# Patient Record
Sex: MALE | Race: WHITE | NOT HISPANIC OR LATINO | Employment: FULL TIME | URBAN - METROPOLITAN AREA
[De-identification: names, ages, dates, MRNs, and addresses within clinical notes are randomized per-mention and may not be internally consistent; named-entity substitution may affect disease eponyms.]

---

## 2018-01-04 NOTE — PROGRESS NOTES
SUBJECTIVE:   CC: Meño Ernst is an 47 year old male who presents for preventative health visit.     Physical   Annual:     Getting at least 3 servings of Calcium per day::  NO    Bi-annual eye exam::  Yes    Dental care twice a year::  NO    Sleep apnea or symptoms of sleep apnea::  None    Diet::  Regular (no restrictions)    Frequency of exercise::  None    Taking medications regularly::  Yes    Medication side effects::  None    Additional concerns today::  No                  Hypertension and erectile dysfunction are of concern today.  Labs are pending and we will assess further based on lab results.    Today's PHQ-2 Score:   PHQ-2 ( 1999 [employer]) 1/9/2018   Q1: Little interest or pleasure in doing things 0   Q2: Feeling down, depressed or hopeless 0   PHQ-2 Score 0   Q1: Little interest or pleasure in doing things Not at all   Q2: Feeling down, depressed or hopeless Not at all   PHQ-2 Score 0       Abuse: Current or Past(Physical, Sexual or Emotional)- No  Do you feel safe in your environment - Yes    Social History   Substance Use Topics     Smoking status: Former Smoker     Quit date: 7/1/2007     Smokeless tobacco: Never Used      Comment: infrequent use  in past     Alcohol use 4.0 oz/week      Comment: moderate     Alcohol Use 1/9/2018   If you drink alcohol, do you typically have greater than 3 drinks per day OR greater than 7 drinks per week?   No     Last PSA: No results found for: PSA    Reviewed orders with patient. Reviewed health maintenance and updated orders accordingly - Yes  Labs reviewed in EPIC  BP Readings from Last 3 Encounters:   01/09/18 (!) 162/117   01/31/14 128/66   11/03/10 118/66    Wt Readings from Last 3 Encounters:   01/09/18 208 lb 6.4 oz (94.5 kg)   01/31/14 223 lb (101.2 kg)   07/14/11 189 lb 8 oz (86 kg)                  Patient Active Problem List   Diagnosis     Impotence of organic origin     Other symptoms involving urinary system     Knee pain     Nasal congestion      Hypertension, goal below 140/90     Hyperlipidemia LDL goal <160     Other male erectile dysfunction     Past Surgical History:   Procedure Laterality Date     REMOVAL OF SPERM DUCT(S)  04/21/1995    Vasectomy     SURGICAL HISTORY OF -       cyst removed above lt eye       Social History   Substance Use Topics     Smoking status: Former Smoker     Quit date: 7/1/2007     Smokeless tobacco: Never Used      Comment: infrequent use  in past     Alcohol use 4.0 oz/week      Comment: moderate     Family History   Problem Relation Age of Onset     Hypertension Father      DIABETES Paternal Grandfather      CEREBROVASCULAR DISEASE Maternal Grandmother      CEREBROVASCULAR DISEASE Maternal Grandfather          Current Outpatient Prescriptions   Medication Sig Dispense Refill     losartan-hydrochlorothiazide (HYZAAR) 50-12.5 MG per tablet Take 1 tablet by mouth daily 90 tablet 1     Allergies   Allergen Reactions     No Known Drug Allergies            Reviewed and updated as needed this visit by clinical staffTobacco  Allergies  Meds  Med Hx  Surg Hx  Fam Hx  Soc Hx        Reviewed and updated as needed this visit by Provider        Past Medical History:   Diagnosis Date     NO ACTIVE PROBLEMS       Past Surgical History:   Procedure Laterality Date     REMOVAL OF SPERM DUCT(S)  04/21/1995    Vasectomy     SURGICAL HISTORY OF -       cyst removed above lt eye       Review of Systems  C: NEGATIVE for fever, chills, change in weight  I: NEGATIVE for worrisome rashes, moles or lesions  E: NEGATIVE for vision changes or irritation  ENT: NEGATIVE for ear, mouth and throat problems  R: NEGATIVE for significant cough or SOB  CV: NEGATIVE for chest pain, palpitations or peripheral edema  GI: NEGATIVE for nausea, abdominal pain, heartburn, or change in bowel habits   male: negative for dysuria, hematuria, decreased urinary stream, erectile dysfunction, urethral discharge  M: NEGATIVE for significant arthralgias or  "myalgia  N: NEGATIVE for weakness, dizziness or paresthesias  P: NEGATIVE for changes in mood or affect    OBJECTIVE:   BP (!) 164/119 (BP Location: Left arm, Patient Position: Chair, Cuff Size: Adult Large)  Pulse 76  Temp 98.1  F (36.7  C) (Oral)  Resp 16  Ht 5' 10.5\" (1.791 m)  Wt 208 lb 6.4 oz (94.5 kg)  BMI 29.48 kg/m2    Physical Exam  GENERAL: healthy, alert and no distress  EYES: Eyes grossly normal to inspection, PERRL and conjunctivae and sclerae normal  HENT: ear canals and TM's normal, nose and mouth without ulcers or lesions  NECK: no adenopathy, no asymmetry, masses, or scars and thyroid normal to palpation  RESP: lungs clear to auscultation - no rales, rhonchi or wheezes  CV: regular rate and rhythm, normal S1 S2, no S3 or S4, no murmur, click or rub, no peripheral edema and peripheral pulses strong  ABDOMEN: soft, nontender, no hepatosplenomegaly, no masses and bowel sounds normal   (male): normal male genitalia without lesions or urethral discharge, no hernia  MS: no gross musculoskeletal defects noted, no edema  SKIN: no suspicious lesions or rashes  NEURO: Normal strength and tone, mentation intact and speech normal  PSYCH: mentation appears normal, affect normal/bright    ASSESSMENT/PLAN:   1. Encounter for WCC (well child check) with abnormal findings  - PSA, screen    2. Hypertension, goal below 140/90  We will need begin treatment immediately and follow-up in 2-3 weeks for the blood pressure check with nursing staff for further evaluation and treatment.  We discussed the pathophysiology behind hypertension and its effect on cardiac function as well as cardiac blood flow.  - Albumin Random Urine Quantitative with Creat Ratio  - Lipid panel reflex to direct LDL Fasting  - Comprehensive metabolic panel (BMP + Alb, Alk Phos, ALT, AST, Total. Bili, TP)  - CBC with platelets and differential  - losartan-hydrochlorothiazide (HYZAAR) 50-12.5 MG per tablet; Take 1 tablet by mouth daily  " "Dispense: 90 tablet; Refill: 1    3. Hyperlipidemia LDL goal <160  Labs pending    4. Other male erectile dysfunction  Labs pending adjust medication approach based on results.  - **Testosterone Free and Total FUTURE anytime  - PSA, screen    COUNSELING:   Reviewed preventive health counseling, as reflected in patient instructions       Regular exercise       Healthy diet/nutrition       Vision screening       Hearing screening           reports that he quit smoking about 10 years ago. He has never used smokeless tobacco.      Estimated body mass index is 29.48 kg/(m^2) as calculated from the following:    Height as of this encounter: 5' 10.5\" (1.791 m).    Weight as of this encounter: 208 lb 6.4 oz (94.5 kg).   Weight management plan: Discussed healthy diet and exercise guidelines and patient will follow up in 6 months in clinic to re-evaluate.    Counseling Resources:  ATP IV Guidelines  Pooled Cohorts Equation Calculator  FRAX Risk Assessment  ICSI Preventive Guidelines  Dietary Guidelines for Americans, 2010  USDA's MyPlate  ASA Prophylaxis  Lung CA Screening    Delroy Lieberman PA-C  [facility]  Answers for HPI/ROS submitted by the patient on 1/9/2018   PHQ-2 Score: 0  If you checked off any problems, how difficult have these problems made it for you to do your work, take care of things at home, or get along with other people?: Not difficult at all  PHQ9 TOTAL SCORE: 0  JEFF 7 TOTAL SCORE: 0    "

## 2018-01-09 ENCOUNTER — TELEPHONE (OUTPATIENT)
Dept: FAMILY MEDICINE | Facility: OTHER | Age: 48
End: 2018-01-09

## 2018-01-09 ENCOUNTER — OFFICE VISIT (OUTPATIENT)
Dept: FAMILY MEDICINE | Facility: OTHER | Age: 48
End: 2018-01-09
Payer: COMMERCIAL

## 2018-01-09 VITALS
HEIGHT: 71 IN | DIASTOLIC BLOOD PRESSURE: 117 MMHG | TEMPERATURE: 98.1 F | WEIGHT: 208.4 LBS | HEART RATE: 70 BPM | SYSTOLIC BLOOD PRESSURE: 162 MMHG | RESPIRATION RATE: 16 BRPM | BODY MASS INDEX: 29.18 KG/M2

## 2018-01-09 DIAGNOSIS — E78.5 HYPERLIPIDEMIA LDL GOAL <160: ICD-10-CM

## 2018-01-09 DIAGNOSIS — I10 HYPERTENSION, GOAL BELOW 140/90: ICD-10-CM

## 2018-01-09 DIAGNOSIS — Z00.121 ENCOUNTER FOR WCC (WELL CHILD CHECK) WITH ABNORMAL FINDINGS: Primary | ICD-10-CM

## 2018-01-09 DIAGNOSIS — N52.8 OTHER MALE ERECTILE DYSFUNCTION: ICD-10-CM

## 2018-01-09 LAB
ALBUMIN SERPL-MCNC: 4.4 G/DL (ref 3.4–5)
ALP SERPL-CCNC: 70 U/L (ref 40–150)
ALT SERPL W P-5'-P-CCNC: 39 U/L (ref 0–70)
ANION GAP SERPL CALCULATED.3IONS-SCNC: 6 MMOL/L (ref 3–14)
AST SERPL W P-5'-P-CCNC: 14 U/L (ref 0–45)
BASOPHILS # BLD AUTO: 0.1 10E9/L (ref 0–0.2)
BASOPHILS NFR BLD AUTO: 0.7 %
BILIRUB SERPL-MCNC: 0.8 MG/DL (ref 0.2–1.3)
BUN SERPL-MCNC: 11 MG/DL (ref 7–30)
CALCIUM SERPL-MCNC: 9.3 MG/DL (ref 8.5–10.1)
CHLORIDE SERPL-SCNC: 105 MMOL/L (ref 94–109)
CHOLEST SERPL-MCNC: 266 MG/DL
CO2 SERPL-SCNC: 26 MMOL/L (ref 20–32)
CREAT SERPL-MCNC: 0.9 MG/DL (ref 0.66–1.25)
CREAT UR-MCNC: 185 MG/DL
DIFFERENTIAL METHOD BLD: NORMAL
EOSINOPHIL # BLD AUTO: 0.2 10E9/L (ref 0–0.7)
EOSINOPHIL NFR BLD AUTO: 2.4 %
ERYTHROCYTE [DISTWIDTH] IN BLOOD BY AUTOMATED COUNT: 13.1 % (ref 10–15)
GFR SERPL CREATININE-BSD FRML MDRD: >90 ML/MIN/1.7M2
GLUCOSE SERPL-MCNC: 94 MG/DL (ref 70–99)
HCT VFR BLD AUTO: 48.2 % (ref 40–53)
HDLC SERPL-MCNC: 48 MG/DL
HGB BLD-MCNC: 16.8 G/DL (ref 13.3–17.7)
LDLC SERPL CALC-MCNC: 171 MG/DL
LYMPHOCYTES # BLD AUTO: 1.8 10E9/L (ref 0.8–5.3)
LYMPHOCYTES NFR BLD AUTO: 25.8 %
MCH RBC QN AUTO: 30.9 PG (ref 26.5–33)
MCHC RBC AUTO-ENTMCNC: 34.9 G/DL (ref 31.5–36.5)
MCV RBC AUTO: 89 FL (ref 78–100)
MICROALBUMIN UR-MCNC: 100 MG/L
MICROALBUMIN/CREAT UR: 53.95 MG/G CR (ref 0–17)
MONOCYTES # BLD AUTO: 0.4 10E9/L (ref 0–1.3)
MONOCYTES NFR BLD AUTO: 5.9 %
NEUTROPHILS # BLD AUTO: 4.6 10E9/L (ref 1.6–8.3)
NEUTROPHILS NFR BLD AUTO: 65.2 %
NONHDLC SERPL-MCNC: 218 MG/DL
PLATELET # BLD AUTO: 224 10E9/L (ref 150–450)
POTASSIUM SERPL-SCNC: 3.9 MMOL/L (ref 3.4–5.3)
PROT SERPL-MCNC: 8.2 G/DL (ref 6.8–8.8)
PSA SERPL-ACNC: 0.37 UG/L (ref 0–4)
RBC # BLD AUTO: 5.44 10E12/L (ref 4.4–5.9)
SODIUM SERPL-SCNC: 137 MMOL/L (ref 133–144)
TRIGL SERPL-MCNC: 233 MG/DL
WBC # BLD AUTO: 7 10E9/L (ref 4–11)

## 2018-01-09 PROCEDURE — 36415 COLL VENOUS BLD VENIPUNCTURE: CPT | Performed by: PHYSICIAN ASSISTANT

## 2018-01-09 PROCEDURE — 80053 COMPREHEN METABOLIC PANEL: CPT | Performed by: PHYSICIAN ASSISTANT

## 2018-01-09 PROCEDURE — 84270 ASSAY OF SEX HORMONE GLOBUL: CPT | Performed by: PHYSICIAN ASSISTANT

## 2018-01-09 PROCEDURE — 82043 UR ALBUMIN QUANTITATIVE: CPT | Performed by: PHYSICIAN ASSISTANT

## 2018-01-09 PROCEDURE — 85025 COMPLETE CBC W/AUTO DIFF WBC: CPT | Performed by: PHYSICIAN ASSISTANT

## 2018-01-09 PROCEDURE — 80061 LIPID PANEL: CPT | Performed by: PHYSICIAN ASSISTANT

## 2018-01-09 PROCEDURE — 99396 PREV VISIT EST AGE 40-64: CPT | Performed by: PHYSICIAN ASSISTANT

## 2018-01-09 PROCEDURE — 84403 ASSAY OF TOTAL TESTOSTERONE: CPT | Performed by: PHYSICIAN ASSISTANT

## 2018-01-09 PROCEDURE — G0103 PSA SCREENING: HCPCS | Performed by: PHYSICIAN ASSISTANT

## 2018-01-09 RX ORDER — LOSARTAN POTASSIUM AND HYDROCHLOROTHIAZIDE 12.5; 5 MG/1; MG/1
1 TABLET ORAL DAILY
Qty: 90 TABLET | Refills: 1 | Status: SHIPPED | OUTPATIENT
Start: 2018-01-09 | End: 2018-06-26

## 2018-01-09 ASSESSMENT — ANXIETY QUESTIONNAIRES
7. FEELING AFRAID AS IF SOMETHING AWFUL MIGHT HAPPEN: NOT AT ALL
GAD7 TOTAL SCORE: 0
5. BEING SO RESTLESS THAT IT IS HARD TO SIT STILL: NOT AT ALL
2. NOT BEING ABLE TO STOP OR CONTROL WORRYING: NOT AT ALL
6. BECOMING EASILY ANNOYED OR IRRITABLE: NOT AT ALL
GAD7 TOTAL SCORE: 0
1. FEELING NERVOUS, ANXIOUS, OR ON EDGE: NOT AT ALL
GAD7 TOTAL SCORE: 0
3. WORRYING TOO MUCH ABOUT DIFFERENT THINGS: NOT AT ALL
4. TROUBLE RELAXING: NOT AT ALL
7. FEELING AFRAID AS IF SOMETHING AWFUL MIGHT HAPPEN: NOT AT ALL

## 2018-01-09 ASSESSMENT — PATIENT HEALTH QUESTIONNAIRE - PHQ9
SUM OF ALL RESPONSES TO PHQ QUESTIONS 1-9: 0
10. IF YOU CHECKED OFF ANY PROBLEMS, HOW DIFFICULT HAVE THESE PROBLEMS MADE IT FOR YOU TO DO YOUR WORK, TAKE CARE OF THINGS AT HOME, OR GET ALONG WITH OTHER PEOPLE: NOT DIFFICULT AT ALL
SUM OF ALL RESPONSES TO PHQ QUESTIONS 1-9: 0

## 2018-01-09 ASSESSMENT — PAIN SCALES - GENERAL: PAINLEVEL: NO PAIN (0)

## 2018-01-09 NOTE — TELEPHONE ENCOUNTER
Called patient to see if he would like to use the blood pressure that was taken in clinic today.  Patient will need to sign form before it can be sent in.   Patient also needs to make appointment to follow up with provider in 2 weeks. Blood pressure from that visit can be used on form if patient would like.   Maddie Tucker CMA ([facility])

## 2018-01-09 NOTE — NURSING NOTE
"Chief Complaint   Patient presents with     Physical     Panel Management     mychart, flu shot, lipids, microalbumin, dap, phq, nacho, bmp       Initial BP (!) 164/119 (BP Location: Left arm, Patient Position: Chair, Cuff Size: Adult Large)  Pulse 76  Temp 98.1  F (36.7  C) (Oral)  Resp 16  Ht 5' 10.5\" (1.791 m)  Wt 208 lb 6.4 oz (94.5 kg)  BMI 29.48 kg/m2 Estimated body mass index is 29.48 kg/(m^2) as calculated from the following:    Height as of this encounter: 5' 10.5\" (1.791 m).    Weight as of this encounter: 208 lb 6.4 oz (94.5 kg).  Medication Reconciliation: complete  "

## 2018-01-09 NOTE — MR AVS SNAPSHOT
After Visit Summary   1/9/2018    Meño Ernst    MRN: [id number]           Patient Information     Date Of Birth          1970        Visit Information        Provider Department      1/9/2018 7:40 AM Delroy Armas PA-C [facility]        Today's Diagnoses     Encounter for WCC (well child check) with abnormal findings    -  1    Hypertension, goal below 140/90        Hyperlipidemia LDL goal <160        Other male erectile dysfunction          Care Instructions      Preventive Health Recommendations  Male Ages 40 to 49    Yearly exam:             See your health care provider every year in order to  o   Review health changes.   o   Discuss preventive care.    o   Review your medicines if your doctor has prescribed any.    You should be tested each year for STDs (sexually transmitted diseases) if you re at risk.     Have a cholesterol test every 5 years.     Have a colonoscopy (test for colon cancer) if someone in your family has had colon cancer or polyps before age 50.     After age 45, have a diabetes test (fasting glucose). If you are at risk for diabetes, you should have this test every 3 years.      Talk with your health care provider about whether or not a prostate cancer screening test (PSA) is right for you.    Shots: Get a flu shot each year. Get a tetanus shot every 10 years.     Nutrition:    Eat at least 5 servings of fruits and vegetables daily.     Eat whole-grain bread, whole-wheat pasta and brown rice instead of white grains and rice.     Talk to your provider about Calcium and Vitamin D.     Lifestyle    Exercise for at least 150 minutes a week (30 minutes a day, 5 days a week). This will help you control your weight and prevent disease.     Limit alcohol to one drink per day.     No smoking.     Wear sunscreen to prevent skin cancer.     See your dentist every six months for an exam and cleaning.              Follow-ups after your visit        Who to contact  "    If you have questions or need follow up information about today's clinic visit or your schedule please contact [facility] directly at [phone].  Normal or non-critical lab and imaging results will be communicated to you by MyChart, letter or phone within 4 business days after the clinic has received the results. If you do not hear from us within 7 days, please contact the clinic through [employer] or phone. If you have a critical or abnormal lab result, we will notify you by phone as soon as possible.  Submit refill requests through [employer] or call your pharmacy and they will forward the refill request to us. Please allow 3 business days for your refill to be completed.          Additional Information About Your Visit        nPicker Information     nPicker lets you send messages to your doctor, view your test results, renew your prescriptions, schedule appointments and more. To sign up, go to [email or web address] . Click on \"Log in\" on the left side of the screen, which will take you to the Welcome page. Then click on \"Sign up Now\" on the right side of the page.     You will be asked to enter the access code listed below, as well as some personal information. Please follow the directions to create your username and password.     Your access code is: [email or web address]  Expires: 2018  8:02 AM     Your access code will  in 90 days. If you need help or a new code, please call your [facility] clinic or [phone].        Care EveryWhere ID     This is your Care EveryWhere ID. This could be used by other organizations to access your [facility] medical records  [id number]        Your Vitals Were     Pulse Temperature Respirations Height BMI (Body Mass Index)       76 98.1  F (36.7  C) (Oral) 16 5' 10.5\" (1.791 m) 29.48 kg/m2        Blood Pressure from Last 3 Encounters:   18 (!) 164/119   14 128/66   11/03/10 118/66    Weight from Last 3 Encounters:   18 208 lb 6.4 oz (94.5 " kg)   01/31/14 223 lb (101.2 kg)   07/14/11 189 lb 8 oz (86 kg)              We Performed the Following     **Testosterone Free and Total FUTURE anytime     Albumin Random Urine Quantitative with Creat Ratio     CBC with platelets and differential     Comprehensive metabolic panel (BMP + Alb, Alk Phos, ALT, AST, Total. Bili, TP)     DEPRESSION ACTION PLAN (DAP)     Lipid panel reflex to direct LDL Fasting     PSA, screen          Today's Medication Changes          These changes are accurate as of: 1/9/18  8:02 AM.  If you have any questions, ask your nurse or doctor.               Start taking these medicines.        Dose/Directions    losartan-hydrochlorothiazide 50-12.5 MG per tablet   Commonly known as:  HYZAAR   Used for:  Hypertension, goal below 140/90   Started by:  Delroy Armas PA-C        Dose:  1 tablet   Take 1 tablet by mouth daily   Quantity:  90 tablet   Refills:  1         Stop taking these medicines if you haven't already. Please contact your care team if you have questions.     lisinopril 20 MG tablet   Commonly known as:  PRINIVIL/ZESTRIL   Stopped by:  Delroy Armas PA-C                Where to get your medicines      These medications were sent to [id number] - [place], [place] - [address] [address]  [address] [address] [address]     Phone:  [phone]     losartan-hydrochlorothiazide 50-12.5 MG per tablet                Primary Care Provider Office Phone # Fax #    Danie uGtierrez [phone]       [address] [place] [address]        Equal Access to Services     [facility] AH: Hadii aad ku hadasho Soomaali, waaxda luqadaha, qaybta kaalmada adeegyada, waxay cassie colvin. So [facility] [phone].    ATENCIÓN: Si habla español, tiene a alfaro disposición servicios gratuitos de asistencia lingüística. Llame al [phone].    We comply with applicable federal civil rights laws and [place] laws. We do not discriminate on the basis  of race, color, national origin, age, disability, sex, sexual orientation, or gender identity.            Thank you!     Thank you for choosing [facility]  for your care. Our goal is always to provide you with excellent care. Hearing back from our patients is one way we can continue to improve our services. Please take a few minutes to complete the written survey that you may receive in the mail after your visit with us. Thank you!             Your Updated Medication List - Protect others around you: Learn how to safely use, store and throw away your medicines at www.disposemymeds.org.          This list is accurate as of: 1/9/18  8:02 AM.  Always use your most recent med list.                   Brand Name Dispense Instructions for use Diagnosis    losartan-hydrochlorothiazide 50-12.5 MG per tablet    HYZAAR    90 tablet    Take 1 tablet by mouth daily    Hypertension, goal below 140/90

## 2018-01-09 NOTE — TELEPHONE ENCOUNTER
Our goal is to have forms completed with 72 hours, however some forms may require a visit or additional information.    Who is the form from?: [facility] comp  Where the form came from: Patient or family brought in     What clinic location was the form placed at?: [place]  Where the form was placed: Given to MA/RN  What number is listed as a contact on the form?: [phone]    Phone call message- patient request for a letter, form or note:    Date needed: as soon as possible  Patient needs appointment in 2 weeks with provider.  Has the patient signed a consent form for release of information? Not Applicable    Additional comments: none    Call taken on 1/9/2018 at 4:48 PM by Maddie Tucker    Type of letter, form or note: work

## 2018-01-09 NOTE — LETTER
My Depression Action Plan  Name: Meño Ernst   Date of Birth 1970  Date: 1/4/2018    My doctor: Danie Gutierrez   My clinic: [facility]  [id number] [place] [id number] [phone]          GREEN    ZONE   Good Control    What it looks like:     Things are going generally well. You have normal up s and down s. You may even feel depressed from time to time, but bad moods usually last less than a day.   What you need to do:  1. Continue to care for yourself (see self care plan)  2. Check your depression survival kit and update it as needed  3. Follow your physician s recommendations including any medication.  4. Do not stop taking medication unless you consult with your physician first.           YELLOW         ZONE Getting Worse    What it looks like:     Depression is starting to interfere with your life.     It may be hard to get out of bed; you may be starting to isolate yourself from others.    Symptoms of depression are starting to last most all day and this has happened for several days.     You may have suicidal thoughts but they are not constant.   What you need to do:     1. Call your care team, your response to treatment will improve if you keep your care team informed of your progress. Yellow periods are signs an adjustment may need to be made.     2. Continue your self-care, even if you have to fake it!    3. Talk to someone in your support network    4. Open up your depression survival kit           RED    ZONE Medical Alert - Get Help    What it looks like:     Depression is seriously interfering with your life.     You may experience these or other symptoms: You can t get out of bed most days, can t work or engage in other necessary activities, you have trouble taking care of basic hygiene, or basic responsibilities, thoughts of suicide or death that will not go away, self-injurious behavior.     What you need to do:  1. Call your care team and  request a same-day appointment. If they are not available (weekends or after hours) call your local crisis line, emergency room or 911.      Electronically signed by: Mary Thomas, January 4, 2018    Depression Self Care Plan / Survival Kit    Self-Care for Depression  Here s the deal. Your body and mind are really not as separate as most people think.  What you do and think affects how you feel and how you feel influences what you do and think. This means if you do things that people who feel good do, it will help you feel better.  Sometimes this is all it takes.  There is also a place for medication and therapy depending on how severe your depression is, so be sure to consult with your medical provider and/ or Behavioral Health Consultant if your symptoms are worsening or not improving.     In order to better manage my stress, I will:    Exercise  Get some form of exercise, every day. This will help reduce pain and release endorphins, the  feel good  chemicals in your brain. This is almost as good as taking antidepressants!  This is not the same as joining a gym and then never going! (they count on that by the way ) It can be as simple as just going for a walk or doing some gardening, anything that will get you moving.      Hygiene   Maintain good hygiene (Get out of bed in the morning, Make your bed, Brush your teeth, Take a shower, and Get dressed like you were going to work, even if you are unemployed).  If your clothes don't fit try to get ones that do.    Diet  I will strive to eat foods that are good for me, drink plenty of water, and avoid excessive sugar, caffeine, alcohol, and other mood-altering substances.  Some foods that are helpful in depression are: complex carbohydrates, B vitamins, flaxseed, fish or fish oil, fresh fruits and vegetables.    Psychotherapy  I agree to participate in Individual Therapy (if recommended).    Medication  If prescribed medications, I agree to take them.   Missing doses can result in serious side effects.  I understand that drinking alcohol, or other illicit drug use, may cause potential side effects.  I will not stop my medication abruptly without first discussing it with my provider.    Staying Connected With Others  I will stay in touch with my friends, family members, and my primary care provider/team.    Use your imagination  Be creative.  We all have a creative side; it doesn t matter if it s oil painting, sand castles, or mud pies! This will also kick up the endorphins.    Witness Beauty  (AKA stop and smell the roses) Take a look outside, even in mid-winter. Notice colors, textures. Watch the squirrels and birds.     Service to others  Be of service to others.  There is always someone else in need.  By helping others we can  get out of ourselves  and remember the really important things.  This also provides opportunities for practicing all the other parts of the program.    Humor  Laugh and be silly!  Adjust your TV habits for less news and crime-drama and more comedy.    Control your stress  Try breathing deep, massage therapy, biofeedback, and meditation. Find time to relax each day.     My support system    Clinic Contact:  Phone number:    Contact 1:  Phone number:    Contact 2:  Phone number:    Evangelical/:  Phone number:    Therapist:  Phone number:    Local crisis center:    Phone number:    Other community support:  Phone number:

## 2018-01-10 ASSESSMENT — PATIENT HEALTH QUESTIONNAIRE - PHQ9: SUM OF ALL RESPONSES TO PHQ QUESTIONS 1-9: 0

## 2018-01-10 ASSESSMENT — ANXIETY QUESTIONNAIRES: GAD7 TOTAL SCORE: 0

## 2018-01-10 NOTE — TELEPHONE ENCOUNTER
Pt is wanted to hold of on the form, because he is going to try the new medication first and then check his bp at his appointment on 1/23rd  and then the form can be filled out and sent at that time.

## 2018-01-11 ENCOUNTER — TELEPHONE (OUTPATIENT)
Dept: FAMILY MEDICINE | Facility: OTHER | Age: 48
End: 2018-01-11

## 2018-01-11 DIAGNOSIS — E78.5 HYPERLIPIDEMIA LDL GOAL <130: Primary | ICD-10-CM

## 2018-01-11 LAB
SHBG SERPL-SCNC: 12 NMOL/L (ref 11–80)
TESTOST FREE SERPL-MCNC: 8.62 NG/DL (ref 4.7–24.4)
TESTOST SERPL-MCNC: 282 NG/DL (ref 240–950)

## 2018-01-11 RX ORDER — ATORVASTATIN CALCIUM 20 MG/1
20 TABLET, FILM COATED ORAL DAILY
Qty: 90 TABLET | Refills: 1 | Status: SHIPPED | OUTPATIENT
Start: 2018-01-11 | End: 2018-07-06

## 2018-01-11 NOTE — TELEPHONE ENCOUNTER
Spoke with patient informed him of results below, he is willing to try the medication you suggested he uses [facility] in [place], thanks  Cynthia Gould RT (R)      Notes Recorded by Delroy Lieberman PA-C on 1/11/2018 at 1:28 PM  After review of labs we note your cholesterol is more elevated than it should be and you should consider taking a medication each night at bedtime to help lower this.  You also are encouraged to begin diet and exercise regimen to help with this.  Your albumin is a bit elevated as well and I would encourage you to have both the test rechecked in 3 months.  If you are willing to begin a medication to help treat this please let us know and we will send it to your pharmacy.  I would advise Lipitor at 20 mg each night and follow-up in 3 months.  Electronically signed:    Delroy Lieberman PA-C

## 2018-01-16 NOTE — PROGRESS NOTES
SUBJECTIVE:                                                    Meño Ernst is a 47 year old male who presents to clinic today for the following health issues:    History of Present Illness     Hyperlipidemia:     Low fat/chol diet rating::  Fair    Taking Statins::  YES    Side effects from hypolipidemia medication::  No muscle aches from Statin    Lipid Medications or Supplements::  None    Hypertension:     Outpatient blood pressures:  Are not being checked    Dietary sodium intake::  Not monitoring salt intake    Diet:  Regular (no restrictions)  Frequency of exercise:  1 day/week  Duration of exercise:  Less than 15 minutes  Taking medications regularly:  Yes  Medication side effects:  None    Problem list and histories reviewed & adjusted, as indicated.  Additional history: as documented    Patient Active Problem List   Diagnosis     Impotence of organic origin     Other symptoms involving urinary system     Knee pain     Nasal congestion     Hypertension, goal below 140/90     Hyperlipidemia LDL goal <130     Other male erectile dysfunction     Past Surgical History:   Procedure Laterality Date     REMOVAL OF SPERM DUCT(S)  04/21/1995    Vasectomy     SURGICAL HISTORY OF -       cyst removed above lt eye       Social History   Substance Use Topics     Smoking status: Former Smoker     Quit date: 7/1/2007     Smokeless tobacco: Never Used      Comment: infrequent use  in past     Alcohol use 4.0 oz/week      Comment: moderate     Family History   Problem Relation Age of Onset     Hypertension Father      DIABETES Paternal Grandfather      CEREBROVASCULAR DISEASE Maternal Grandmother      CEREBROVASCULAR DISEASE Maternal Grandfather          Current Outpatient Prescriptions   Medication Sig Dispense Refill     atorvastatin (LIPITOR) 20 MG tablet Take 1 tablet (20 mg) by mouth daily 90 tablet 1     losartan-hydrochlorothiazide (HYZAAR) 50-12.5 MG per tablet Take 1 tablet by mouth daily 90 tablet 1      Allergies   Allergen Reactions     No Known Drug Allergies      Recent Labs   Lab Test  01/09/18   0808  01/31/14   1015  06/28/10   1546   LDL  171*  142*  159*   HDL  48  38*  48   TRIG  233*  241*  131   ALT  39   --   27   CR  0.90  0.99  0.93   GFRESTIMATED  >90  Not Calculated  >90   GFRESTBLACK  >90  Not Calculated  >90   POTASSIUM  3.9  3.9  4.5   TSH   --   1.41   --       BP Readings from Last 3 Encounters:   01/23/18 142/78   01/09/18 (!) 162/117   01/31/14 128/66    Wt Readings from Last 3 Encounters:   01/23/18 212 lb 3.2 oz (96.3 kg)   01/09/18 208 lb 6.4 oz (94.5 kg)   01/31/14 223 lb (101.2 kg)                  Labs reviewed in EPIC          ROS:  Constitutional, HEENT, cardiovascular, pulmonary, gi and gu systems are negative, except as otherwise noted.      OBJECTIVE:   /80 (BP Location: Right arm, Patient Position: Chair, Cuff Size: Adult Regular)  Pulse 76  Temp 97.5  F (36.4  C) (Temporal)  Resp 16  Wt 212 lb 3.2 oz (96.3 kg)  BMI 30.02 kg/m2  Body mass index is 30.02 kg/(m^2).  GENERAL: healthy, alert and no distress  NECK: no adenopathy, no asymmetry, masses, or scars and thyroid normal to palpation  RESP: lungs clear to auscultation - no rales, rhonchi or wheezes  CV: regular rate and rhythm, normal S1 S2, no S3 or S4, no murmur, click or rub, no peripheral edema and peripheral pulses strong  MS: no gross musculoskeletal defects noted, no edema    Diagnostic Test Results:  No results found for this or any previous visit (from the past 24 hour(s)).    ASSESSMENT/PLAN:     1. Hypertension, goal below 140/90  Doing well, much improved.    ROV 3 months    Delroy Lieberman PA-C  [facility]  Answers for HPI/ROS submitted by the patient on 1/23/2018   PHQ-2 Score: 0

## 2018-01-23 ENCOUNTER — OFFICE VISIT (OUTPATIENT)
Dept: FAMILY MEDICINE | Facility: OTHER | Age: 48
End: 2018-01-23
Payer: COMMERCIAL

## 2018-01-23 VITALS
BODY MASS INDEX: 30.02 KG/M2 | HEART RATE: 76 BPM | SYSTOLIC BLOOD PRESSURE: 134 MMHG | RESPIRATION RATE: 16 BRPM | WEIGHT: 212.2 LBS | DIASTOLIC BLOOD PRESSURE: 80 MMHG | TEMPERATURE: 97.5 F

## 2018-01-23 DIAGNOSIS — I10 HYPERTENSION, GOAL BELOW 140/90: Primary | ICD-10-CM

## 2018-01-23 PROCEDURE — 99213 OFFICE O/P EST LOW 20 MIN: CPT | Performed by: PHYSICIAN ASSISTANT

## 2018-01-23 ASSESSMENT — PAIN SCALES - GENERAL: PAINLEVEL: NO PAIN (0)

## 2018-01-23 NOTE — MR AVS SNAPSHOT
"              After Visit Summary   2018    Meño Ernst    MRN: [id number]           Patient Information     Date Of Birth          1970        Visit Information        Provider Department      2018 7:40 AM Delroy Armas PA-C [facility]        Today's Diagnoses     Hypertension, goal below 140/90    -  1       Follow-ups after your visit        Who to contact     If you have questions or need follow up information about today's clinic visit or your schedule please contact [facility] directly at [phone].  Normal or non-critical lab and imaging results will be communicated to you by Sw[employer]thart, letter or phone within 4 business days after the clinic has received the results. If you do not hear from us within 7 days, please contact the clinic through Swyfthart or phone. If you have a critical or abnormal lab result, we will notify you by phone as soon as possible.  Submit refill requests through [employer] or call your pharmacy and they will forward the refill request to us. Please allow 3 business days for your refill to be completed.          Additional Information About Your Visit        MyChart Information     [employer] lets you send messages to your doctor, view your test results, renew your prescriptions, schedule appointments and more. To sign up, go to [email or web address] . Click on \"Log in\" on the left side of the screen, which will take you to the Welcome page. Then click on \"Sign up Now\" on the right side of the page.     You will be asked to enter the access code listed below, as well as some personal information. Please follow the directions to create your username and password.     Your access code is: [email or web address]  Expires: 2018  8:02 AM     Your access code will  in 90 days. If you need help or a new code, please call your [facility] or [phone].        Care EveryWhere ID     This is your Care EveryWhere ID. This could be used by " other organizations to access your [place] medical records  [id number]        Your Vitals Were     Pulse Temperature Respirations BMI (Body Mass Index)          76 97.5  F (36.4  C) (Temporal) 16 30.02 kg/m2         Blood Pressure from Last 3 Encounters:   01/23/18 142/78   01/09/18 (!) 162/117   01/31/14 128/66    Weight from Last 3 Encounters:   01/23/18 212 lb 3.2 oz (96.3 kg)   01/09/18 208 lb 6.4 oz (94.5 kg)   01/31/14 223 lb (101.2 kg)              Today, you had the following     No orders found for display       Primary Care Provider Office Phone # Fax #    Danie Chuckie Gutierrez [phone]       [address] [place] [address]        Equal Access to Services     [facility]: Hadii mary reyes hadasho Soomaali, waaxda luqadaha, qaybta kaalmada adeegyada, herbert guardado . So [facility] [phone].    ATENCIÓN: Si habla español, tiene a alfaro disposición servicios gratuitos de asistencia lingüística. Boby al [phone].    We comply with applicable federal civil rights laws and [place] laws. We do not discriminate on the basis of race, color, national origin, age, disability, sex, sexual orientation, or gender identity.            Thank you!     Thank you for choosing [facility]  for your care. Our goal is always to provide you with excellent care. Hearing back from our patients is one way we can continue to improve our services. Please take a few minutes to complete the written survey that you may receive in the mail after your visit with us. Thank you!             Your Updated Medication List - Protect others around you: Learn how to safely use, store and throw away your medicines at www.disposemymeds.org.          This list is accurate as of: 1/23/18  7:53 AM.  Always use your most recent med list.                   Brand Name Dispense Instructions for use Diagnosis    atorvastatin 20 MG tablet    LIPITOR    90 tablet    Take 1 tablet (20 mg)  by mouth daily    Hyperlipidemia LDL goal <130       losartan-hydrochlorothiazide 50-12.5 MG per tablet    HYZAAR    90 tablet    Take 1 tablet by mouth daily    Hypertension, goal below 140/90

## 2018-01-23 NOTE — TELEPHONE ENCOUNTER
Forms have been completed, signed, faxed/mailed, and sent to scanning.  Maddie Tucker CMA ([facility])

## 2018-01-23 NOTE — NURSING NOTE
"Chief Complaint   Patient presents with     Hypertension     Panel Management     Flu       Initial /78 (BP Location: Right arm, Patient Position: Chair, Cuff Size: Adult Large)  Pulse 76  Temp 97.5  F (36.4  C) (Temporal)  Resp 16  Wt 212 lb 3.2 oz (96.3 kg)  BMI 30.02 kg/m2 Estimated body mass index is 30.02 kg/(m^2) as calculated from the following:    Height as of 1/9/18: 5' 10.5\" (1.791 m).    Weight as of this encounter: 212 lb 3.2 oz (96.3 kg).  Medication Reconciliation: complete  "

## 2018-06-26 DIAGNOSIS — I10 HYPERTENSION, GOAL BELOW 140/90: ICD-10-CM

## 2018-06-27 RX ORDER — LOSARTAN POTASSIUM AND HYDROCHLOROTHIAZIDE 12.5; 5 MG/1; MG/1
1 TABLET ORAL DAILY
Qty: 30 TABLET | Refills: 0 | Status: SHIPPED | OUTPATIENT
Start: 2018-06-27 | End: 2018-07-06 | Stop reason: ALTCHOICE

## 2018-06-27 NOTE — TELEPHONE ENCOUNTER
Left message for patient to return call to clinic.  Please inform of message below and help schedule follow up appointments.    Brii Cespedes, [facility]  June 27, 2018

## 2018-06-27 NOTE — TELEPHONE ENCOUNTER
"Requested Prescriptions   Pending Prescriptions Disp Refills     losartan-hydrochlorothiazide (HYZAAR) 50-12.5 MG per tablet 90 tablet 1     Sig: Take 1 tablet by mouth daily    Angiotensin-II Receptors Passed    6/26/2018 11:01 AM       Passed - Blood pressure under 140/90 in past 12 months    BP Readings from Last 3 Encounters:   01/23/18 134/80   01/09/18 (!) 162/117   01/31/14 128/66          Passed - Recent (12 mo) or future (30 days) visit within the authorizing provider's specialty    Patient had office visit in the last 12 months or has a visit in the next 30 days with authorizing provider or within the authorizing provider's specialty.  See \"Patient Info\" tab in inbasket, or \"Choose Columns\" in Meds & Orders section of the refill encounter.           Passed - Patient is age 18 or older       Passed - Normal serum creatinine on file in past 12 months    Recent Labs   Lab Test  01/09/18   0808   CR  0.90          Passed - Normal serum potassium on file in past 12 months    Recent Labs   Lab Test  01/09/18   0808   POTASSIUM  3.9            losartan-hydrochlorothiazide (HYZAAR) 50-12.5 MG per tablet  Medication is being filled for 1 time refill only due to:  Patient needs to be seen because over due for 3 month HTN followup.   Please assist with scheduling.    Katie Ramos, RN, BSN         "

## 2018-06-28 NOTE — PROGRESS NOTES
SUBJECTIVE:   Meño Ernst is a 47 year old male who presents to clinic today for the following health issues:      History of Present Illness     Hyperlipidemia:     Low fat/chol diet rating::  Fair    Taking Statins::  STOPPED    Side effects from hypolipidemia medication::  Muscle aches after taking Statin    Hypertension:     Outpatient blood pressures:  Are not being checked    Dietary sodium intake::  Not monitoring salt intake    Diet:  Regular (no restrictions)  Frequency of exercise:  1 day/week  Duration of exercise:  30-45 minutes  Taking medications regularly:  Yes  Medication side effects:  Muscle aches    Problem list and histories reviewed & adjusted, as indicated.  Additional history: as documented    Patient Active Problem List   Diagnosis     Impotence of organic origin     Other symptoms involving urinary system     Knee pain     Nasal congestion     Hypertension, goal below 140/90     Hyperlipidemia LDL goal <130     Other male erectile dysfunction     Past Surgical History:   Procedure Laterality Date     REMOVAL OF SPERM DUCT(S)  04/21/1995    Vasectomy     SURGICAL HISTORY OF -       cyst removed above lt eye       Social History   Substance Use Topics     Smoking status: Former Smoker     Quit date: 7/1/2007     Smokeless tobacco: Never Used      Comment: infrequent use  in past     Alcohol use 4.0 oz/week      Comment: moderate     Family History   Problem Relation Age of Onset     Hypertension Father      Diabetes Paternal Grandfather      Cerebrovascular Disease Maternal Grandmother      Cerebrovascular Disease Maternal Grandfather          Current Outpatient Prescriptions   Medication Sig Dispense Refill     losartan-hydrochlorothiazide (HYZAAR) 100-25 MG per tablet Take 1 tablet by mouth daily 90 tablet 1     pravastatin (PRAVACHOL) 40 MG tablet Take 1 tablet (40 mg) by mouth daily 90 tablet 1     Allergies   Allergen Reactions     No Known Drug Allergies      Recent Labs   Lab  Test  01/09/18   0808  01/31/14   1015  06/28/10   1546   LDL  171*  142*  159*   HDL  48  38*  48   TRIG  233*  241*  131   ALT  39   --   27   CR  0.90  0.99  0.93   GFRESTIMATED  >90  Not Calculated  >90   GFRESTBLACK  >90  Not Calculated  >90   POTASSIUM  3.9  3.9  4.5   TSH   --   1.41   --       BP Readings from Last 3 Encounters:   07/06/18 (!) (P) 152/100   01/23/18 134/80   01/09/18 (!) 162/117    Wt Readings from Last 3 Encounters:   07/06/18 209 lb (94.8 kg)   01/23/18 212 lb 3.2 oz (96.3 kg)   01/09/18 208 lb 6.4 oz (94.5 kg)                    ROS:  Constitutional, HEENT, cardiovascular, pulmonary, gi and gu systems are negative, except as otherwise noted.    OBJECTIVE:     BP (!) (P) 152/100 (Cuff Size: Adult Large)  Pulse 72  Temp 97.1  F (36.2  C) (Temporal)  Resp 18  Wt 209 lb (94.8 kg)  BMI 29.56 kg/m2  Body mass index is 29.56 kg/(m^2).  GENERAL: healthy, alert and no distress  NECK: no adenopathy, no asymmetry, masses, or scars and trachea midline and normal to palpation  RESP: lungs clear to auscultation - no rales, rhonchi or wheezes  CV: regular rate and rhythm, normal S1 S2, no S3 or S4, no murmur, click or rub, no peripheral edema and peripheral pulses strong  MS: no gross musculoskeletal defects noted, no edema  NEURO: Normal strength and tone, mentation intact and speech normal  PSYCH: mentation appears normal, affect normal/bright    Diagnostic Test Results:  No results found for this or any previous visit (from the past 24 hour(s)).    ASSESSMENT/PLAN:     1. Hypertension, goal below 140/90  Needs increased dose and ROV 2 weeks.  - losartan-hydrochlorothiazide (HYZAAR) 100-25 MG per tablet; Take 1 tablet by mouth daily  Dispense: 90 tablet; Refill: 1  - Lipid panel reflex to direct LDL Fasting; Future  - Comprehensive metabolic panel; Future    2. Hyperlipidemia LDL goal <130  Needs to start meds   - Lipid panel reflex to direct LDL Fasting; Future  - Comprehensive metabolic panel;  Future  - pravastatin (PRAVACHOL) 40 MG tablet; Take 1 tablet (40 mg) by mouth daily  Dispense: 90 tablet; Refill: 1    ROV 2 weeks for blood pressure check  ROV 3 months for med check.    Delroy Lieberman PA-C  [facility]  Answers for HPI/ROS submitted by the patient on 7/6/2018   PHQ-2 Score: 0  If you checked off any problems, how difficult have these problems made it for you to do your work, take care of things at home, or get along with other people?: Not difficult at all  PHQ9 TOTAL SCORE: 0  JEFF 7 TOTAL SCORE: 0

## 2018-07-06 ENCOUNTER — OFFICE VISIT (OUTPATIENT)
Dept: FAMILY MEDICINE | Facility: OTHER | Age: 48
End: 2018-07-06
Payer: COMMERCIAL

## 2018-07-06 VITALS
TEMPERATURE: 97.1 F | DIASTOLIC BLOOD PRESSURE: 100 MMHG | BODY MASS INDEX: 29.56 KG/M2 | HEART RATE: 72 BPM | RESPIRATION RATE: 18 BRPM | WEIGHT: 209 LBS | SYSTOLIC BLOOD PRESSURE: 132 MMHG

## 2018-07-06 DIAGNOSIS — E78.5 HYPERLIPIDEMIA LDL GOAL <130: ICD-10-CM

## 2018-07-06 DIAGNOSIS — I10 HYPERTENSION, GOAL BELOW 140/90: ICD-10-CM

## 2018-07-06 PROCEDURE — 99213 OFFICE O/P EST LOW 20 MIN: CPT | Performed by: PHYSICIAN ASSISTANT

## 2018-07-06 RX ORDER — LOSARTAN POTASSIUM AND HYDROCHLOROTHIAZIDE 12.5; 5 MG/1; MG/1
1 TABLET ORAL DAILY
Qty: 30 TABLET | Refills: 0 | Status: CANCELLED | OUTPATIENT
Start: 2018-07-06

## 2018-07-06 RX ORDER — LOSARTAN POTASSIUM AND HYDROCHLOROTHIAZIDE 25; 100 MG/1; MG/1
1 TABLET ORAL DAILY
Qty: 90 TABLET | Refills: 1 | Status: SHIPPED | OUTPATIENT
Start: 2018-07-06 | End: 2019-02-19

## 2018-07-06 RX ORDER — ATORVASTATIN CALCIUM 20 MG/1
20 TABLET, FILM COATED ORAL DAILY
Qty: 90 TABLET | Refills: 1 | Status: SHIPPED | OUTPATIENT
Start: 2018-07-06 | End: 2018-07-06

## 2018-07-06 RX ORDER — PRAVASTATIN SODIUM 40 MG
40 TABLET ORAL DAILY
Qty: 90 TABLET | Refills: 1 | Status: SHIPPED | OUTPATIENT
Start: 2018-07-06 | End: 2019-02-19

## 2018-07-06 ASSESSMENT — ANXIETY QUESTIONNAIRES
5. BEING SO RESTLESS THAT IT IS HARD TO SIT STILL: NOT AT ALL
GAD7 TOTAL SCORE: 0
GAD7 TOTAL SCORE: 0
7. FEELING AFRAID AS IF SOMETHING AWFUL MIGHT HAPPEN: NOT AT ALL
GAD7 TOTAL SCORE: 0
2. NOT BEING ABLE TO STOP OR CONTROL WORRYING: NOT AT ALL
6. BECOMING EASILY ANNOYED OR IRRITABLE: NOT AT ALL
4. TROUBLE RELAXING: NOT AT ALL
1. FEELING NERVOUS, ANXIOUS, OR ON EDGE: NOT AT ALL
3. WORRYING TOO MUCH ABOUT DIFFERENT THINGS: NOT AT ALL
7. FEELING AFRAID AS IF SOMETHING AWFUL MIGHT HAPPEN: NOT AT ALL

## 2018-07-06 ASSESSMENT — PAIN SCALES - GENERAL: PAINLEVEL: NO PAIN (0)

## 2018-07-06 NOTE — MR AVS SNAPSHOT
After Visit Summary   7/6/2018    Meño Ernst    MRN: [id number]           Patient Information     Date Of Birth          1970        Visit Information        Provider Department      7/6/2018 7:40 AM Delroy Armas PA-C [facility]        Today's Diagnoses     Hypertension, goal below 140/90        Hyperlipidemia LDL goal <130           Follow-ups after your visit        Follow-up notes from your care team     Return in about 2 weeks (around 7/20/2018) for BP Recheck with provider.      Future tests that were ordered for you today     Open Future Orders        Priority Expected Expires Ordered    Lipid panel reflex to direct LDL Fasting Routine  8/6/2018 7/6/2018    Comprehensive metabolic panel Routine  8/6/2018 7/6/2018            Who to contact     If you have questions or need follow up information about today's clinic visit or your schedule please contact [facility] directly at [phone].  Normal or non-critical lab and imaging results will be communicated to you by MyChart, letter or phone within 4 business days after the clinic has received the results. If you do not hear from us within 7 days, please contact the clinic through MyChart or phone. If you have a critical or abnormal lab result, we will notify you by phone as soon as possible.  Submit refill requests through [employer] or call your pharmacy and they will forward the refill request to us. Please allow 3 business days for your refill to be completed.          Additional Information About Your Visit        Care EveryWhere ID     This is your Care EveryWhere ID. This could be used by other organizations to access your [place] medical records  [id number]        Your Vitals Were     Pulse Temperature Respirations BMI (Body Mass Index)          72 97.1  F (36.2  C) (Temporal) 18 29.56 kg/m2         Blood Pressure from Last 3 Encounters:   07/06/18 (!) (P) 152/100   01/23/18 134/80   01/09/18  (!) 162/117    Weight from Last 3 Encounters:   07/06/18 209 lb (94.8 kg)   01/23/18 212 lb 3.2 oz (96.3 kg)   01/09/18 208 lb 6.4 oz (94.5 kg)                 Today's Medication Changes          These changes are accurate as of 7/6/18  7:59 AM.  If you have any questions, ask your nurse or doctor.               Start taking these medicines.        Dose/Directions    losartan-hydrochlorothiazide 100-25 MG per tablet   Commonly known as:  HYZAAR   Used for:  Hypertension, goal below 140/90   Replaces:  losartan-hydrochlorothiazide 50-12.5 MG per tablet   Started by:  Delroy Armas PA-C        Dose:  1 tablet   Take 1 tablet by mouth daily   Quantity:  90 tablet   Refills:  1       pravastatin 40 MG tablet   Commonly known as:  PRAVACHOL   Used for:  Hyperlipidemia LDL goal <130   Started by:  Delroy Armas PA-C        Dose:  40 mg   Take 1 tablet (40 mg) by mouth daily   Quantity:  90 tablet   Refills:  1         Stop taking these medicines if you haven't already. Please contact your care team if you have questions.     atorvastatin 20 MG tablet   Commonly known as:  LIPITOR   Stopped by:  Delroy Armas PA-C           losartan-hydrochlorothiazide 50-12.5 MG per tablet   Commonly known as:  HYZAAR   Replaced by:  losartan-hydrochlorothiazide 100-25 MG per tablet   Stopped by:  Delroy Armas PA-C                Where to get your medicines      These medications were sent to [id number] - [place], [place] - [address] [address]  [address] [address] [address]     Phone:  [phone]     losartan-hydrochlorothiazide 100-25 MG per tablet    pravastatin 40 MG tablet                Primary Care Provider Office Phone # Fax #    Danie Gutierrez [phone]       [address] [place] [address]        Equal Access to Services     KELI NAVA AH: Tyler landa Soruslan, waaxda luqadaha, qaybta kaalmada adeegyada, herbert colvin. So [facility]  [phone].    ATENCIÓN: Si jung weeks, tiene a alfaro disposición servicios gratuitos de asistencia lingüística. Boby blum [phone].    We comply with applicable federal civil rights laws and [place] laws. We do not discriminate on the basis of race, color, national origin, age, disability, sex, sexual orientation, or gender identity.            Thank you!     Thank you for choosing [facility]  for your care. Our goal is always to provide you with excellent care. Hearing back from our patients is one way we can continue to improve our services. Please take a few minutes to complete the written survey that you may receive in the mail after your visit with us. Thank you!             Your Updated Medication List - Protect others around you: Learn how to safely use, store and throw away your medicines at www.disposemymeds.org.          This list is accurate as of 7/6/18  7:59 AM.  Always use your most recent med list.                   Brand Name Dispense Instructions for use Diagnosis    losartan-hydrochlorothiazide 100-25 MG per tablet    HYZAAR    90 tablet    Take 1 tablet by mouth daily    Hypertension, goal below 140/90       pravastatin 40 MG tablet    PRAVACHOL    90 tablet    Take 1 tablet (40 mg) by mouth daily    Hyperlipidemia LDL goal <130

## 2018-07-07 ASSESSMENT — PATIENT HEALTH QUESTIONNAIRE - PHQ9: SUM OF ALL RESPONSES TO PHQ QUESTIONS 1-9: 0

## 2018-07-07 ASSESSMENT — ANXIETY QUESTIONNAIRES: GAD7 TOTAL SCORE: 0

## 2018-07-25 ENCOUNTER — TELEPHONE (OUTPATIENT)
Dept: FAMILY MEDICINE | Facility: OTHER | Age: 48
End: 2018-07-25

## 2018-07-25 NOTE — TELEPHONE ENCOUNTER
Reason for Call:  Medication or medication refill:    Do you use a [facility]?  Name of the pharmacy and phone number for the current request:  [facility] - [phone]    Name of the medication requested: lisinopril     Other request:   Patient had a health screening at work and his blood pressure 3/4 of the times was still high. Is wondering if he should change back to lisinopril?   Currently taking losartan    Can we leave a detailed message on this number? YES    Phone number patient can be reached at: Home number on file [phone] (home)    Best Time: any    Call taken on 7/25/2018 at 8:19 AM by Marbella Romero

## 2018-07-25 NOTE — TELEPHONE ENCOUNTER
"Per OV on 7/6/18:    1. Hypertension, goal below 140/90  Needs increased dose and ROV 2 weeks.  - losartan-hydrochlorothiazide (HYZAAR) 100-25 MG per tablet; Take 1 tablet by mouth daily  Dispense: 90 tablet; Refill: 1  - Lipid panel reflex to direct LDL Fasting; Future  - Comprehensive metabolic panel; Future     2. Hyperlipidemia LDL goal <130  Needs to start meds   - Lipid panel reflex to direct LDL Fasting; Future  - Comprehensive metabolic panel; Future  - pravastatin (PRAVACHOL) 40 MG tablet; Take 1 tablet (40 mg) by mouth daily  Dispense: 90 tablet; Refill: 1     ROV 2 weeks for blood pressure check  ROV 3 months for med check.     Delroy Lieberman PA-C  [facility]    Spoke with pt and he declined scheduling any of the above visits as he \"just had this checked with someone at work and do not see the point in coming in and spending more money on something was just seen for\". Pt did not have readings on hand as they are at home and he is at work. Offered to schedule float nurse visit and stressed that this is a no cost visit for him to get at least 1 BP reading in his chart for review but declined doing this. Stated he can call tonight or tomorrow with BP readings he got from his work for provider to review. Will wait for him call back with readings.    Stephanie Muir, SATNAM ([facility])    "

## 2018-07-27 NOTE — TELEPHONE ENCOUNTER
Left message for pt to return our call    When pt calls back please get the latest bp readings from him.

## 2018-07-30 NOTE — TELEPHONE ENCOUNTER
Left message for pt to return call, when call is returned give information below and help schedule OV with PCP (Dr Gutierrez) for BP check and discuss change in medication. If Dr Gutierrez is no longer PCP please update accordingly.     Stephanie Muir CMA ([facility])

## 2018-07-30 NOTE — TELEPHONE ENCOUNTER
Patient needs OV for HTN.  Would advise Toprol XL 25mg daily.  Electronically signed:    Delroy Lieberman PA-C

## 2018-07-31 NOTE — TELEPHONE ENCOUNTER
Patient returned call and was given information below, patient doesn't want to schedule an appointment at this time.    Thank you Mitra

## 2018-08-01 ENCOUNTER — TELEPHONE (OUTPATIENT)
Dept: FAMILY MEDICINE | Facility: OTHER | Age: 48
End: 2018-08-01

## 2018-08-01 NOTE — LETTER
[facility]  [id number] [place] [id number] [phone]          August 1, 2018    Meño Ernst                                                                                                                     [id number] [place] [id number]          Dear Meño,    Delroy Lieberman PA-C and I have reviewed your recent blood pressures and see that your numbers are elevated.      Your BP Readings from Last 3 Encounters:  07/06/18 : (!) (P) 152/100  01/23/18 : 134/80  01/09/18 : (!) 162/117    You are so close to getting this within a healthy goal.    I am inviting you to work with me to help you lower this number and improve your over all health.  Delroy Lieberman PA-C and our registered nurse team have come up with a plan including medication management of your blood pressure medications with the inclusion of small adjustments to daily intake and activity.    If you would like to work with me and work through the RN Hypertension Program, please call the clinic and we can schedule you for a inital and follow up visits for RN HTN.  Let me know if you have any questions or concerns.    Sincerely,   Sudheer Rothman, RN, BSN  Working with Delroy Lieberman PA-C   [facility]  [phone]

## 2018-09-10 ENCOUNTER — TELEPHONE (OUTPATIENT)
Dept: FAMILY MEDICINE | Facility: OTHER | Age: 48
End: 2018-09-10

## 2018-09-10 NOTE — TELEPHONE ENCOUNTER
Summary:    Patient is due/failing the following:   Hypertension follow up and BP CHECK    Action needed:   Patient needs office visit for follow up.    Type of outreach:    Phone, left message for patient to call back.     Questions for provider review:    None                                                                                                                                    Nadya Julian       Chart routed to Care Team .          Panel Management Review      Patient has the following on his problem list:     Hypertension   Last three blood pressure readings:  BP Readings from Last 3 Encounters:   07/06/18 (!) (P) 152/100   01/23/18 134/80   01/09/18 (!) 162/117     Blood pressure: FAILED    HTN Guidelines:  Age 18-59 BP range:  Less than 140/90  Age 60-85 with Diabetes:  Less than 140/90  Age 60-85 without Diabetes:  less than 150/90      Composite cancer screening  Chart review shows that this patient is due/due soon for the following None

## 2018-12-20 DIAGNOSIS — I10 HYPERTENSION, GOAL BELOW 140/90: ICD-10-CM

## 2018-12-20 DIAGNOSIS — E78.5 HYPERLIPIDEMIA LDL GOAL <130: ICD-10-CM

## 2018-12-20 RX ORDER — LOSARTAN POTASSIUM AND HYDROCHLOROTHIAZIDE 25; 100 MG/1; MG/1
TABLET ORAL
Qty: 90 TABLET | Refills: 1 | OUTPATIENT
Start: 2018-12-20

## 2018-12-20 RX ORDER — PRAVASTATIN SODIUM 40 MG
TABLET ORAL
Qty: 90 TABLET | Refills: 1 | OUTPATIENT
Start: 2018-12-20

## 2018-12-20 NOTE — TELEPHONE ENCOUNTER
hyzaar    BP Readings from Last 3 Encounters:   07/06/18 (!) (P) 152/100   01/23/18 134/80   01/09/18 (!) 162/117     Routing refill request to provider for review/approval because:  Labs out of range:  B/P    Pravastatin    Routing refill request to provider for review/approval because:  LOV 7/6/2018 pt was to return to clinic for B/P check. Fasting labs due in 1/2019      Lynn Padilla, RN, BSN

## 2018-12-20 NOTE — TELEPHONE ENCOUNTER
Left message for pt to return call. If call is returned, please give information below.    Rosemarie Meredith MA

## 2019-02-18 NOTE — PROGRESS NOTES
SUBJECTIVE:   Meño Ernst is a 48 year old male who presents to clinic today for the following health issues:      History of Present Illness     Hyperlipidemia:     Low fat/chol diet rating::  Not monitoring fat    Taking Statins::  STOPPED    Side effects from hypolipidemia medication::  Muscle aches after taking Statin    Lipid Medications or Supplements::  None    Hypertension:     Outpatient blood pressures:  Are not being checked    Dietary sodium intake::  No added salt diet    Diet:  Regular (no restrictions)  Frequency of exercise:  1 day/week  Duration of exercise:  15-30 minutes  Taking medications regularly:  Yes  Medication side effects:  Muscle aches    Patient admits to be noncompliant with respect to medications.  He has stopped his cholesterol medication because it caused body aches and has not been rechecked since 2018.  He states he has been taking his blood pressure medication over the last month or so wants to go back to lisinopril because he thought that that medication worked better than what he is taking currently.  At this point in time we make changes to his medications and advised that he return in 2-3 weeks for blood pressure check and come in fasting for related labs.  Problem list and histories reviewed & adjusted, as indicated.  Additional history: as documented    Patient Active Problem List   Diagnosis     Impotence of organic origin     Other symptoms involving urinary system     Knee pain     Nasal congestion     Hypertension, goal below 140/90     Hyperlipidemia LDL goal <130     Other male erectile dysfunction     Past Surgical History:   Procedure Laterality Date     REMOVAL OF SPERM DUCT(S)  1995    Vasectomy     SURGICAL HISTORY OF -       cyst removed above lt eye       Social History     Tobacco Use     Smoking status: Former Smoker     Last attempt to quit: 2007     Years since quittin.6     Smokeless tobacco: Never Used     Tobacco comment:  "infrequent use  in past   Substance Use Topics     Alcohol use: Yes     Alcohol/week: 4.0 oz     Comment: moderate     Family History   Problem Relation Age of Onset     Hypertension Father      Diabetes Paternal Grandfather      Cerebrovascular Disease Maternal Grandmother      Cerebrovascular Disease Maternal Grandfather          Current Outpatient Medications   Medication Sig Dispense Refill     lisinopril (PRINIVIL/ZESTRIL) 20 MG tablet Take 1 tablet (20 mg) by mouth daily 30 tablet 0     Allergies   Allergen Reactions     No Known Drug Allergies      Recent Labs   Lab Test 01/09/18  0808 01/31/14  1015   * 142*   HDL 48 38*   TRIG 233* 241*   ALT 39  --    CR 0.90 0.99   GFRESTIMATED >90 Not Calculated   GFRESTBLACK >90 Not Calculated   POTASSIUM 3.9 3.9   TSH  --  1.41      BP Readings from Last 3 Encounters:   02/19/19 (!) 146/108   07/06/18 (!) (P) 152/100   01/23/18 134/80    Wt Readings from Last 3 Encounters:   02/19/19 97.1 kg (214 lb)   07/06/18 94.8 kg (209 lb)   01/23/18 96.3 kg (212 lb 3.2 oz)                    ROS:  Constitutional, HEENT, cardiovascular, pulmonary, gi and gu systems are negative, except as otherwise noted.    OBJECTIVE:     BP (!) 146/108 (Cuff Size: Adult Large)   Pulse 72   Temp 98.7  F (37.1  C) (Temporal)   Resp 16   Ht 1.792 m (5' 10.54\")   Wt 97.1 kg (214 lb)   BMI 30.24 kg/m    Body mass index is 30.24 kg/m .  GENERAL: healthy, alert and no distress  NECK: no adenopathy, no asymmetry, masses, or scars and trachea midline and normal to palpation  RESP: lungs clear to auscultation - no rales, rhonchi or wheezes  CV: regular rate and rhythm, normal S1 S2, no S3 or S4, no murmur, click or rub, no peripheral edema and peripheral pulses strong  MS: no gross musculoskeletal defects noted, no edema  NEURO: Normal strength and tone, mentation intact and speech normal  PSYCH: mentation appears normal, affect normal/bright    Diagnostic Test Results:  No results found for " this or any previous visit (from the past 24 hour(s)).    ASSESSMENT/PLAN:     1. Screening for HIV (human immunodeficiency virus)  2. Need for prophylactic vaccination and inoculation against influenza  Declines health maintenance interventions at this point in time.    3. Hypertension, goal below 140/90  We will have him stop his Hyzaar and begin his lisinopril since hopefully he will be more compliant with medication that he thinks should work.  We will reevaluate in 2 weeks.  - lisinopril (PRINIVIL/ZESTRIL) 20 MG tablet; Take 1 tablet (20 mg) by mouth daily  Dispense: 30 tablet; Refill: 0    4. Hyperlipidemia LDL goal <130  Reviewed labs with him today and look at his last LDL noted to be quite elevated.  At this point time we will have this rechecked in a couple weeks when he is back in for blood pressure check.    Delroy Lieberman PA-C  [facility]  Answers for HPI/ROS submitted by the patient on 2/19/2019   Chronic problems general questions HPI Form  If you checked off any problems, how difficult have these problems made it for you to do your work, take care of things at home, or get along with other people?: Not difficult at all  PHQ9 TOTAL SCORE: 0  JEFF 7 TOTAL SCORE: 0

## 2019-02-19 ENCOUNTER — OFFICE VISIT (OUTPATIENT)
Dept: FAMILY MEDICINE | Facility: OTHER | Age: 49
End: 2019-02-19
Payer: COMMERCIAL

## 2019-02-19 VITALS
TEMPERATURE: 98.7 F | WEIGHT: 214 LBS | BODY MASS INDEX: 29.96 KG/M2 | DIASTOLIC BLOOD PRESSURE: 108 MMHG | HEART RATE: 72 BPM | SYSTOLIC BLOOD PRESSURE: 146 MMHG | RESPIRATION RATE: 16 BRPM | HEIGHT: 71 IN

## 2019-02-19 DIAGNOSIS — Z11.4 SCREENING FOR HIV (HUMAN IMMUNODEFICIENCY VIRUS): ICD-10-CM

## 2019-02-19 DIAGNOSIS — Z23 NEED FOR PROPHYLACTIC VACCINATION AND INOCULATION AGAINST INFLUENZA: ICD-10-CM

## 2019-02-19 DIAGNOSIS — E78.5 HYPERLIPIDEMIA LDL GOAL <130: ICD-10-CM

## 2019-02-19 DIAGNOSIS — I10 HYPERTENSION, GOAL BELOW 140/90: Primary | ICD-10-CM

## 2019-02-19 PROCEDURE — 99214 OFFICE O/P EST MOD 30 MIN: CPT | Performed by: PHYSICIAN ASSISTANT

## 2019-02-19 RX ORDER — LISINOPRIL 20 MG/1
20 TABLET ORAL DAILY
Qty: 30 TABLET | Refills: 0 | Status: SHIPPED | OUTPATIENT
Start: 2019-02-19 | End: 2019-03-11

## 2019-02-19 ASSESSMENT — ANXIETY QUESTIONNAIRES
7. FEELING AFRAID AS IF SOMETHING AWFUL MIGHT HAPPEN: NOT AT ALL
7. FEELING AFRAID AS IF SOMETHING AWFUL MIGHT HAPPEN: NOT AT ALL
GAD7 TOTAL SCORE: 0
5. BEING SO RESTLESS THAT IT IS HARD TO SIT STILL: NOT AT ALL
1. FEELING NERVOUS, ANXIOUS, OR ON EDGE: NOT AT ALL
4. TROUBLE RELAXING: NOT AT ALL
6. BECOMING EASILY ANNOYED OR IRRITABLE: NOT AT ALL
2. NOT BEING ABLE TO STOP OR CONTROL WORRYING: NOT AT ALL
3. WORRYING TOO MUCH ABOUT DIFFERENT THINGS: NOT AT ALL

## 2019-02-19 ASSESSMENT — MIFFLIN-ST. JEOR: SCORE: 1855.52

## 2019-02-19 ASSESSMENT — PATIENT HEALTH QUESTIONNAIRE - PHQ9
10. IF YOU CHECKED OFF ANY PROBLEMS, HOW DIFFICULT HAVE THESE PROBLEMS MADE IT FOR YOU TO DO YOUR WORK, TAKE CARE OF THINGS AT HOME, OR GET ALONG WITH OTHER PEOPLE: NOT DIFFICULT AT ALL
SUM OF ALL RESPONSES TO PHQ QUESTIONS 1-9: 0
SUM OF ALL RESPONSES TO PHQ QUESTIONS 1-9: 0

## 2019-02-19 ASSESSMENT — PAIN SCALES - GENERAL: PAINLEVEL: NO PAIN (0)

## 2019-02-20 ASSESSMENT — PATIENT HEALTH QUESTIONNAIRE - PHQ9: SUM OF ALL RESPONSES TO PHQ QUESTIONS 1-9: 0

## 2019-02-20 ASSESSMENT — ANXIETY QUESTIONNAIRES: GAD7 TOTAL SCORE: 0

## 2019-03-11 ENCOUNTER — OFFICE VISIT (OUTPATIENT)
Dept: FAMILY MEDICINE | Facility: OTHER | Age: 49
End: 2019-03-11
Payer: COMMERCIAL

## 2019-03-11 VITALS
DIASTOLIC BLOOD PRESSURE: 100 MMHG | HEART RATE: 80 BPM | WEIGHT: 214.3 LBS | RESPIRATION RATE: 16 BRPM | SYSTOLIC BLOOD PRESSURE: 150 MMHG | TEMPERATURE: 99.4 F | BODY MASS INDEX: 30.28 KG/M2

## 2019-03-11 DIAGNOSIS — R10.9 FLANK PAIN: Primary | ICD-10-CM

## 2019-03-11 DIAGNOSIS — K21.9 GASTROESOPHAGEAL REFLUX DISEASE, ESOPHAGITIS PRESENCE NOT SPECIFIED: ICD-10-CM

## 2019-03-11 DIAGNOSIS — I10 HYPERTENSION, GOAL BELOW 140/90: ICD-10-CM

## 2019-03-11 LAB
ALBUMIN UR-MCNC: NEGATIVE MG/DL
APPEARANCE UR: CLEAR
BILIRUB UR QL STRIP: NEGATIVE
COLOR UR AUTO: YELLOW
CREAT UR-MCNC: 25 MG/DL
GLUCOSE UR STRIP-MCNC: NEGATIVE MG/DL
HGB UR QL STRIP: ABNORMAL
KETONES UR STRIP-MCNC: NEGATIVE MG/DL
LEUKOCYTE ESTERASE UR QL STRIP: NEGATIVE
MICROALBUMIN UR-MCNC: 26 MG/L
MICROALBUMIN/CREAT UR: 103.97 MG/G CR (ref 0–17)
NITRATE UR QL: NEGATIVE
PH UR STRIP: 6.5 PH (ref 5–7)
RBC #/AREA URNS AUTO: NORMAL /HPF
SOURCE: ABNORMAL
SP GR UR STRIP: 1.01 (ref 1–1.03)
UROBILINOGEN UR STRIP-ACNC: 0.2 EU/DL (ref 0.2–1)
WBC #/AREA URNS AUTO: NORMAL /HPF

## 2019-03-11 PROCEDURE — 99214 OFFICE O/P EST MOD 30 MIN: CPT | Performed by: PHYSICIAN ASSISTANT

## 2019-03-11 PROCEDURE — 82043 UR ALBUMIN QUANTITATIVE: CPT | Performed by: PHYSICIAN ASSISTANT

## 2019-03-11 PROCEDURE — 81001 URINALYSIS AUTO W/SCOPE: CPT | Performed by: PHYSICIAN ASSISTANT

## 2019-03-11 RX ORDER — OMEPRAZOLE 40 MG/1
40 CAPSULE, DELAYED RELEASE ORAL DAILY
Qty: 90 CAPSULE | Refills: 0 | Status: SHIPPED | OUTPATIENT
Start: 2019-03-11 | End: 2019-05-30

## 2019-03-11 RX ORDER — LISINOPRIL AND HYDROCHLOROTHIAZIDE 12.5; 2 MG/1; MG/1
2 TABLET ORAL DAILY
Qty: 180 TABLET | Refills: 1 | Status: SHIPPED | OUTPATIENT
Start: 2019-03-11 | End: 2019-10-03

## 2019-03-11 RX ORDER — TAMSULOSIN HYDROCHLORIDE 0.4 MG/1
0.4 CAPSULE ORAL DAILY
Qty: 14 CAPSULE | Refills: 0 | Status: SHIPPED | OUTPATIENT
Start: 2019-03-11 | End: 2019-03-25

## 2019-03-11 ASSESSMENT — PAIN SCALES - GENERAL: PAINLEVEL: MODERATE PAIN (5)

## 2019-03-11 NOTE — LETTER
March 12, 2019      Meño Ernst  [address] [place] [id number]        Dear ,    We are writing to inform you of your test results.    Recheck microalbumin in 2 months with lab appointment.    Resulted Orders   *UA reflex to Microscopic and Culture ([facility] (except [place] and [place])   Result Value Ref Range    Color Urine Yellow     Appearance Urine Clear     Glucose Urine Negative NEG^Negative mg/dL    Bilirubin Urine Negative NEG^Negative    Ketones Urine Negative NEG^Negative mg/dL    Specific Gravity Urine 1.010 1.003 - 1.035    Blood Urine Trace (A) NEG^Negative    pH Urine 6.5 5.0 - 7.0 pH    Protein Albumin Urine Negative NEG^Negative mg/dL    Urobilinogen Urine 0.2 0.2 - 1.0 EU/dL    Nitrite Urine Negative NEG^Negative    Leukocyte Esterase Urine Negative NEG^Negative    Source Midstream Urine    Albumin Random Urine Quantitative with Creat Ratio   Result Value Ref Range    Creatinine Urine 25 mg/dL    Albumin Urine mg/L 26 mg/L    Albumin Urine mg/g Cr 103.97 (H) 0 - 17 mg/g Cr   Urine Microscopic   Result Value Ref Range    WBC Urine 0 - 5 OTO5^0 - 5 /HPF    RBC Urine O - 2 OTO2^O - 2 /HPF       If you have any questions or concerns, please call the clinic at the number listed above.       Sincerely,        Delroy Lieberman PA-C

## 2019-03-11 NOTE — PROGRESS NOTES
SUBJECTIVE:   Meño Ernst is a 48 year old male who presents to clinic today for the following health issues:      HPI  Back Pain       Duration: 1.5-2 weeks        Specific cause: none    Description:   Location of pain: middle of back left  Character of pain: sharp and Annoying  Pain radiation:none  New numbness or weakness in legs, not attributed to pain:  no     Intensity: Currently 4-5/10    History:   Pain interferes with job: No  History of back problems: no prior back problems  Any previous MRI or X-rays: None  Sees a specialist for back pain:  No  Therapies tried without relief: none    Alleviating factors:   Improved by: NSAIDs      Precipitating factors:  Worsened by: Some positions increase the pain     Patient denies current urinary symptoms.       Accompanying Signs & Symptoms:  Risk of Fracture:  None  Risk of Cauda Equina:  None  Risk of Infection:  None  Risk of Cancer:  None  Risk of Ankylosing Spondylitis:  Onset at age <35, male, AND morning back stiffness. no     Problem list and histories reviewed & adjusted, as indicated.  Additional history: as documented    Patient Active Problem List   Diagnosis     Impotence of organic origin     Other symptoms involving urinary system     Knee pain     Nasal congestion     Hypertension, goal below 140/90     Hyperlipidemia LDL goal <130     Other male erectile dysfunction     Past Surgical History:   Procedure Laterality Date     REMOVAL OF SPERM DUCT(S)  1995    Vasectomy     SURGICAL HISTORY OF -       cyst removed above lt eye       Social History     Tobacco Use     Smoking status: Former Smoker     Last attempt to quit: 2007     Years since quittin.7     Smokeless tobacco: Never Used     Tobacco comment: infrequent use  in past   Substance Use Topics     Alcohol use: Yes     Alcohol/week: 4.0 oz     Comment: moderate     Family History   Problem Relation Age of Onset     Hypertension Father      Diabetes Paternal Grandfather       Cerebrovascular Disease Maternal Grandmother      Cerebrovascular Disease Maternal Grandfather          Current Outpatient Medications   Medication Sig Dispense Refill     lisinopril-hydrochlorothiazide (PRINZIDE/ZESTORETIC) 20-12.5 MG tablet Take 2 tablets by mouth daily 180 tablet 1     omeprazole (PRILOSEC) 40 MG DR capsule Take 1 capsule (40 mg) by mouth daily Take 30-60 minutes before a meal. 90 capsule 0     tamsulosin (FLOMAX) 0.4 MG capsule Take 1 capsule (0.4 mg) by mouth daily for 14 days 14 capsule 0     Allergies   Allergen Reactions     No Known Drug Allergies      Recent Labs   Lab Test 01/09/18  0808 01/31/14  1015   * 142*   HDL 48 38*   TRIG 233* 241*   ALT 39  --    CR 0.90 0.99   GFRESTIMATED >90 Not Calculated   GFRESTBLACK >90 Not Calculated   POTASSIUM 3.9 3.9   TSH  --  1.41      BP Readings from Last 3 Encounters:   03/11/19 (!) 150/100   02/19/19 (!) 146/108   07/06/18 (!) (P) 152/100    Wt Readings from Last 3 Encounters:   03/11/19 97.2 kg (214 lb 4.8 oz)   02/19/19 97.1 kg (214 lb)   07/06/18 94.8 kg (209 lb)                  Labs reviewed in EPIC    ROS:  CONSTITUTIONAL: NEGATIVE for fever, chills, change in weight  INTEGUMENTARY/SKIN: NEGATIVE for worrisome rashes, moles or lesions  ENT/MOUTH: NEGATIVE for ear, mouth and throat problems  RESP: NEGATIVE for significant cough or SOB  CV: NEGATIVE for chest pain, palpitations or peripheral edema  GI: NEGATIVE for nausea, abdominal pain, heartburn, or change in bowel habits  MUSCULOSKELETAL: NEGATIVE for significant arthralgias or myalgia  NEURO: NEGATIVE for weakness, dizziness or paresthesias  PSYCHIATRIC: NEGATIVE for changes in mood or affect    OBJECTIVE:     BP (!) 150/100 (Cuff Size: Adult Large)   Pulse 80   Temp 99.4  F (37.4  C) (Temporal)   Resp 16   Wt 97.2 kg (214 lb 4.8 oz)   BMI 30.28 kg/m    Body mass index is 30.28 kg/m .  GENERAL: healthy, alert and no distress  NECK: no adenopathy, no asymmetry, masses, or  scars and trachea midline and normal to palpation  RESP: lungs clear to auscultation - no rales, rhonchi or wheezes  CV: regular rate and rhythm, normal S1 S2, no S3 or S4, no murmur, click or rub, no peripheral edema and peripheral pulses strong  ABDOMEN: soft, nontender, without hepatosplenomegaly or masses, bowel sounds normal negative pain to palpation and percussion of the costovertebral angles over the kidneys  MS: no gross musculoskeletal defects noted, no edema  SKIN: no suspicious lesions or rashes to visible skin today.  NEURO: Normal strength and tone, mentation intact and speech normal  PSYCH: mentation appears normal, affect normal/bright    Diagnostic Test Results:  Results for orders placed or performed in visit on 03/11/19 (from the past 24 hour(s))   *UA reflex to Microscopic and Culture ([facility] (except [place] and [place])   Result Value Ref Range    Color Urine Yellow     Appearance Urine Clear     Glucose Urine Negative NEG^Negative mg/dL    Bilirubin Urine Negative NEG^Negative    Ketones Urine Negative NEG^Negative mg/dL    Specific Gravity Urine 1.010 1.003 - 1.035    Blood Urine Trace (A) NEG^Negative    pH Urine 6.5 5.0 - 7.0 pH    Protein Albumin Urine Negative NEG^Negative mg/dL    Urobilinogen Urine 0.2 0.2 - 1.0 EU/dL    Nitrite Urine Negative NEG^Negative    Leukocyte Esterase Urine Negative NEG^Negative    Source Midstream Urine    Urine Microscopic   Result Value Ref Range    WBC Urine 0 - 5 OTO5^0 - 5 /HPF    RBC Urine O - 2 OTO2^O - 2 /HPF       ASSESSMENT/PLAN:     1. Flank pain  2. Hypertension, goal below 140/90  Further evaluation is warranted as I suspect that he may have renal calculi causing some of his overall signs and symptoms at this point in time.  We will increase his blood pressure medications to try to help with the blood pressure.  - Albumin Random Urine Quantitative with Creat Ratio  - lisinopril-hydrochlorothiazide (PRINZIDE/ZESTORETIC)  20-12.5 MG tablet; Take 2 tablets by mouth daily  Dispense: 180 tablet; Refill: 1  - US Renal Complete; Future  - tamsulosin (FLOMAX) 0.4 MG capsule; Take 1 capsule (0.4 mg) by mouth daily for 14 days  Dispense: 14 capsule; Refill: 0    3. Gastroesophageal reflux disease, esophagitis presence not specified  Refill of medications is granted at this point time.  - omeprazole (PRILOSEC) 40 MG DR capsule; Take 1 capsule (40 mg) by mouth daily Take 30-60 minutes before a meal.  Dispense: 90 capsule; Refill: 0    ROV 3-4 weeks for blood pressure check.    Delroy Lieberman PA-C  [facility]

## 2019-03-13 ENCOUNTER — ANCILLARY PROCEDURE (OUTPATIENT)
Dept: ULTRASOUND IMAGING | Facility: OTHER | Age: 49
End: 2019-03-13
Attending: PHYSICIAN ASSISTANT
Payer: COMMERCIAL

## 2019-03-13 DIAGNOSIS — I10 HYPERTENSION, GOAL BELOW 140/90: ICD-10-CM

## 2019-03-13 DIAGNOSIS — R10.9 FLANK PAIN: ICD-10-CM

## 2019-03-13 PROCEDURE — 76770 US EXAM ABDO BACK WALL COMP: CPT

## 2019-03-18 DIAGNOSIS — I10 HYPERTENSION, GOAL BELOW 140/90: ICD-10-CM

## 2019-03-19 RX ORDER — LISINOPRIL 20 MG/1
TABLET ORAL
Qty: 30 TABLET | Refills: 0 | OUTPATIENT
Start: 2019-03-19

## 2019-03-19 NOTE — TELEPHONE ENCOUNTER
"Requested Prescriptions   Pending Prescriptions Disp Refills     lisinopril (PRINIVIL/ZESTRIL) 20 MG tablet [Pharmacy Med Name: LISINOPRIL 20MG TABS] 30 tablet 0     Sig: TAKE ONE TABLET BY MOUTH ONCE DAILY    ACE Inhibitors (Including Combos) Protocol Failed - 3/18/2019  1:01 AM       Failed - Blood pressure under 140/90 in past 12 months    BP Readings from Last 3 Encounters:   03/11/19 (!) 150/100   02/19/19 (!) 146/108   07/06/18 (!) (P) 152/100          Failed - Medication is active on med list       Failed - Normal serum creatinine on file in past 12 months    Recent Labs   Lab Test 01/09/18  0808   CR 0.90          Failed - Normal serum potassium on file in past 12 months    Recent Labs   Lab Test 01/09/18  0808   POTASSIUM 3.9          Passed - Recent (12 mo) or future (30 days) visit within the authorizing provider's specialty    Patient had office visit in the last 12 months or has a visit in the next 30 days with authorizing provider or within the authorizing provider's specialty.  See \"Patient Info\" tab in inbasket, or \"Choose Columns\" in Meds & Orders section of the refill encounter.           Passed - Patient is age 18 or older        Last OV 03/11/2019  Last filled 03/11/2019    Should have refills from last ov.    Sudheer Rothman, RN, BSN        "

## 2019-05-30 DIAGNOSIS — K21.9 GASTROESOPHAGEAL REFLUX DISEASE, ESOPHAGITIS PRESENCE NOT SPECIFIED: ICD-10-CM

## 2019-05-31 RX ORDER — OMEPRAZOLE 40 MG/1
CAPSULE, DELAYED RELEASE ORAL
Qty: 90 CAPSULE | Refills: 2 | Status: SHIPPED | OUTPATIENT
Start: 2019-05-31 | End: 2020-03-04

## 2019-05-31 NOTE — TELEPHONE ENCOUNTER
Pending Prescriptions:                       Disp   Refills    omeprazole (PRILOSEC) 40 MG DR capsule [P*90 cap*0            Sig: TAKE 1 CAPSULE BY MOUTH DAILY, 30 TO 60 MINUTES           BEFORE A MEAL.    Prescription approved per [facility] Refill Protocol.    Pia Alba, RN, BSN

## 2019-08-09 ENCOUNTER — TELEPHONE (OUTPATIENT)
Dept: FAMILY MEDICINE | Facility: OTHER | Age: 49
End: 2019-08-09

## 2019-08-09 NOTE — TELEPHONE ENCOUNTER
Summary:    Patient is due/failing the following:   BMP, creatinine , BP CHECK, LDL and PHYSICAL    Action needed:   Patient needs office visit for Physical and BP check and Patient needs fasting lab only appointment    Type of outreach:    Phone, left message for patient to call back.     Questions for provider review:    None                                                                                                                                    Nadya Julian       Chart routed to Care Team .      Panel Management Review      Patient has the following on his problem list:     Hypertension   Last three blood pressure readings:  BP Readings from Last 3 Encounters:   03/11/19 (!) 150/100   02/19/19 (!) 146/108   07/06/18 (!) (P) 152/100     Blood pressure: Passed    HTN Guidelines:  Less than 140/90      Composite cancer screening  Chart review shows that this patient is due/due soon for the following None

## 2019-08-09 NOTE — LETTER
[facility]  [address] [place] [id number]  Phone: [phone]  August 28, 2019      Meño Ernst  [address] [place] [id number]      Dear Meño,    We care about your health and have reviewed your health plan including your medical conditions, medications, and lab results.  Based on this review, it is recommended that you follow up regarding the following health topic(s):  -High Blood Pressure  -Wellness (Physical) Visit     We recommend you take the following action(s):  -schedule a WELLNESS (Physical) APPOINTMENT.  We will perform the following labs: Lipids (fasting cholesterol - nothing to eat except water and/or meds for 8-10 hours), BMP (basic metabolic panel) and Creatinine.     Please call us at the [facility] - [phone] (or use [facility]) to address the above recommendations.     Thank you for trusting [facility] and we appreciate the opportunity to serve you.  We look forward to supporting your healthcare needs in the future.    Healthy Regards,    Your Health Care Team  [facility]

## 2019-09-30 NOTE — PROGRESS NOTES
SUBJECTIVE:   CC: Meño Ernst is an 49 year old male who presents for preventative health visit.     Healthy Habits:     Getting at least 3 servings of Calcium per day:  Yes    Bi-annual eye exam:  Yes    Dental care twice a year:  NO    Sleep apnea or symptoms of sleep apnea:  None    Diet:  Low salt    Frequency of exercise:  1 day/week    Duration of exercise:  Less than 15 minutes    Taking medications regularly:  Yes    Medication side effects:  Not applicable    PHQ-2 Total Score: 0    Additional concerns today:  No    Today's PHQ-2 Score:   PHQ-2 (  [employer]) 10/3/2019   Q1: Little interest or pleasure in doing things 0   Q2: Feeling down, depressed or hopeless 0   PHQ-2 Score 0   Q1: Little interest or pleasure in doing things Not at all   Q2: Feeling down, depressed or hopeless Not at all   PHQ-2 Score 0       Abuse: Current or Past(Physical, Sexual or Emotional)- No  Do you feel safe in your environment? Yes    Social History     Tobacco Use     Smoking status: Former Smoker     Last attempt to quit: 2007     Years since quittin.2     Smokeless tobacco: Never Used     Tobacco comment: infrequent use  in past   Substance Use Topics     Alcohol use: Yes     Alcohol/week: 6.7 standard drinks     Comment: moderate         Alcohol Use 10/3/2019   Prescreen: >3 drinks/day or >7 drinks/week? Yes   AUDIT SCORE  5     AUDIT - Alcohol Use Disorders Identification Test - Reproduced from the World Health Organization Audit 2001 (Second Edition) 10/3/2019   1.  How often do you have a drink containing alcohol? 2 to 3 times a week   2.  How many drinks containing alcohol do you have on a typical day when you are drinking? 3 or 4   3.  How often do you have five or more drinks on one occasion? Less than monthly   4.  How often during the last year have you found that you were not able to stop drinking once you had started? Never   5.  How often during the last year have you failed to do what was  normally expected of you because of drinking? Never   6.  How often during the last year have you needed a first drink in the morning to get yourself going after a heavy drinking session? Never   7.  How often during the last year have you had a feeling of guilt or remorse after drinking? Never   8.  How often during the last year have you been unable to remember what happened the night before because of your drinking? Never   9.  Have you or someone else been injured because of your drinking? No   10. Has a relative, friend, doctor or other health care worker been concerned about your drinking or suggested you cut down? No   TOTAL SCORE 5       Last PSA:   PSA   Date Value Ref Range Status   2018 0.37 0 - 4 ug/L Final     Comment:     Assay Method:  Chemiluminescence using Siemens Vista analyzer       Reviewed orders with patient. Reviewed health maintenance and updated orders accordingly - Yes  Lab work is in process  Labs reviewed in EPIC  BP Readings from Last 3 Encounters:   10/03/19 (!) 148/98   19 (!) 150/100   19 (!) 146/108    Wt Readings from Last 3 Encounters:   10/03/19 98.6 kg (217 lb 4.8 oz)   19 97.2 kg (214 lb 4.8 oz)   19 97.1 kg (214 lb)                  Patient Active Problem List   Diagnosis     Impotence of organic origin     Other symptoms involving urinary system     Knee pain     Nasal congestion     Hypertension, goal below 140/90     Hyperlipidemia LDL goal <130     Other male erectile dysfunction     Past Surgical History:   Procedure Laterality Date     REMOVAL OF SPERM DUCT(S)  1995    Vasectomy     SURGICAL HISTORY OF -       cyst removed above lt eye       Social History     Tobacco Use     Smoking status: Former Smoker     Last attempt to quit: 2007     Years since quittin.2     Smokeless tobacco: Never Used     Tobacco comment: infrequent use  in past   Substance Use Topics     Alcohol use: Yes     Alcohol/week: 6.7 standard drinks      Comment: moderate     Family History   Problem Relation Age of Onset     Hypertension Father      Diabetes Paternal Grandfather      Cerebrovascular Disease Maternal Grandmother      Cerebrovascular Disease Maternal Grandfather          Current Outpatient Medications   Medication Sig Dispense Refill     lisinopril-hydrochlorothiazide (PRINZIDE/ZESTORETIC) 20-12.5 MG tablet Take 2 tablets by mouth daily 180 tablet 1     omeprazole (PRILOSEC) 40 MG DR capsule TAKE 1 CAPSULE BY MOUTH DAILY, 30 TO 60 MINUTES BEFORE A MEAL. 90 capsule 2     Allergies   Allergen Reactions     No Known Drug Allergies      Recent Labs   Lab Test 01/09/18  0808 01/31/14  1015   * 142*   HDL 48 38*   TRIG 233* 241*   ALT 39  --    CR 0.90 0.99   GFRESTIMATED >90 Not Calculated   GFRESTBLACK >90 Not Calculated   POTASSIUM 3.9 3.9   TSH  --  1.41        Reviewed and updated as needed this visit by clinical staff  Tobacco  Allergies  Meds  Med Hx  Surg Hx  Fam Hx  Soc Hx        Reviewed and updated as needed this visit by Provider        Past Medical History:   Diagnosis Date     NO ACTIVE PROBLEMS       Past Surgical History:   Procedure Laterality Date     REMOVAL OF SPERM DUCT(S)  04/21/1995    Vasectomy     SURGICAL HISTORY OF -       cyst removed above lt eye       Review of Systems   Constitutional: Negative for chills and fever.   HENT: Negative for congestion, ear pain, hearing loss and sore throat.    Eyes: Negative for pain and visual disturbance.   Respiratory: Negative for cough and shortness of breath.    Cardiovascular: Negative for chest pain, palpitations and peripheral edema.   Gastrointestinal: Negative for abdominal pain, constipation, diarrhea, heartburn, hematochezia and nausea.   Genitourinary: Negative for dysuria, frequency, genital sores, hematuria and urgency.   Musculoskeletal: Negative for arthralgias, joint swelling and myalgias.   Skin: Negative for rash.   Neurological: Negative for dizziness,  "weakness, headaches and paresthesias.   Psychiatric/Behavioral: Negative for mood changes. The patient is not nervous/anxious.        OBJECTIVE:   BP (!) 154/102   Pulse 72   Temp 98  F (36.7  C) (Temporal)   Resp 16   Ht 1.792 m (5' 10.54\")   Wt 98.6 kg (217 lb 4.8 oz)   SpO2 99%   BMI 30.70 kg/m      Physical Exam  GENERAL: healthy, alert and no distress  EYES: Eyes grossly normal to inspection, PERRL and conjunctivae and sclerae normal  HENT: ear canals and TM's normal, nose and mouth without ulcers or lesions  NECK: no adenopathy, no asymmetry, masses, or scars and thyroid normal to palpation  RESP: lungs clear to auscultation - no rales, rhonchi or wheezes  CV: regular rate and rhythm, normal S1 S2, no S3 or S4, no murmur, click or rub, no peripheral edema and peripheral pulses strong  ABDOMEN: soft, nontender, no hepatosplenomegaly, no masses and bowel sounds normal  MS: no gross musculoskeletal defects noted, no edema  SKIN: no suspicious lesions or rashes  NEURO: Normal strength and tone, mentation intact and speech normal  PSYCH: mentation appears normal, affect normal/bright    Diagnostic Test Results:  Labs reviewed in Epic  No results found for this or any previous visit (from the past 24 hour(s)).    ASSESSMENT/PLAN:   1. Routine general medical examination at a health care facility  - Lipid panel reflex to direct LDL Fasting  - Comprehensive metabolic panel  - Hemoglobin    2. Hypertension, goal below 140/90  Advised labs and a repeat of his blood pressure in 2 weeks.  He has been out of medications for the last couple of weeks.  - Lipid panel reflex to direct LDL Fasting  - lisinopril-hydrochlorothiazide (PRINZIDE/ZESTORETIC) 20-12.5 MG tablet; Take 2 tablets by mouth daily  Dispense: 180 tablet; Refill: 1  - Comprehensive metabolic panel  - Hemoglobin    3. Hyperlipidemia LDL goal <130  - Lipid panel reflex to direct LDL Fasting  - Comprehensive metabolic panel  - Hemoglobin    4. Special " "screening for malignant neoplasms, colon  - Fecal colorectal cancer screen (FIT); Future    5. Screening for prostate cancer  - PSA, screen    COUNSELING:   Reviewed preventive health counseling, as reflected in patient instructions       Regular exercise       Healthy diet/nutrition       Vision screening       Hearing screening    Estimated body mass index is 30.7 kg/m  as calculated from the following:    Height as of this encounter: 1.792 m (5' 10.54\").    Weight as of this encounter: 98.6 kg (217 lb 4.8 oz).     Weight management plan: Discussed healthy diet and exercise guidelines     reports that he quit smoking about 12 years ago. He has never used smokeless tobacco.    Counseling Resources:  ATP IV Guidelines  Pooled Cohorts Equation Calculator  FRAX Risk Assessment  ICSI Preventive Guidelines  Dietary Guidelines for Americans, 2010  USDA's MyPlate  ASA Prophylaxis  Lung CA Screening    Delroy Lieberman PA-C  [facility]  Answers for HPI/ROS submitted by the patient on 10/3/2019   Annual Exam:  If you checked off any problems, how difficult have these problems made it for you to do your work, take care of things at home, or get along with other people?: Not difficult at all  PHQ9 TOTAL SCORE: 0  JEFF 7 TOTAL SCORE: 0    "

## 2019-10-03 ENCOUNTER — OFFICE VISIT (OUTPATIENT)
Dept: FAMILY MEDICINE | Facility: OTHER | Age: 49
End: 2019-10-03
Payer: COMMERCIAL

## 2019-10-03 VITALS
HEART RATE: 72 BPM | DIASTOLIC BLOOD PRESSURE: 98 MMHG | RESPIRATION RATE: 16 BRPM | WEIGHT: 217.3 LBS | TEMPERATURE: 98 F | SYSTOLIC BLOOD PRESSURE: 148 MMHG | BODY MASS INDEX: 30.42 KG/M2 | OXYGEN SATURATION: 99 % | HEIGHT: 71 IN

## 2019-10-03 DIAGNOSIS — E78.5 HYPERLIPIDEMIA LDL GOAL <130: ICD-10-CM

## 2019-10-03 DIAGNOSIS — I10 HYPERTENSION, GOAL BELOW 140/90: Primary | ICD-10-CM

## 2019-10-03 DIAGNOSIS — Z12.11 SPECIAL SCREENING FOR MALIGNANT NEOPLASMS, COLON: ICD-10-CM

## 2019-10-03 DIAGNOSIS — Z12.5 SCREENING FOR PROSTATE CANCER: ICD-10-CM

## 2019-10-03 DIAGNOSIS — Z00.00 ROUTINE GENERAL MEDICAL EXAMINATION AT A HEALTH CARE FACILITY: ICD-10-CM

## 2019-10-03 LAB
ALBUMIN SERPL-MCNC: 4.6 G/DL (ref 3.4–5)
ALP SERPL-CCNC: 54 U/L (ref 40–150)
ALT SERPL W P-5'-P-CCNC: 47 U/L (ref 0–70)
ANION GAP SERPL CALCULATED.3IONS-SCNC: 7 MMOL/L (ref 3–14)
AST SERPL W P-5'-P-CCNC: 22 U/L (ref 0–45)
BILIRUB SERPL-MCNC: 1 MG/DL (ref 0.2–1.3)
BUN SERPL-MCNC: 12 MG/DL (ref 7–30)
CALCIUM SERPL-MCNC: 9.7 MG/DL (ref 8.5–10.1)
CHLORIDE SERPL-SCNC: 105 MMOL/L (ref 94–109)
CHOLEST SERPL-MCNC: 251 MG/DL
CO2 SERPL-SCNC: 27 MMOL/L (ref 20–32)
CREAT SERPL-MCNC: 0.95 MG/DL (ref 0.66–1.25)
GFR SERPL CREATININE-BSD FRML MDRD: >90 ML/MIN/{1.73_M2}
GLUCOSE SERPL-MCNC: 80 MG/DL (ref 70–99)
HDLC SERPL-MCNC: 43 MG/DL
HGB BLD-MCNC: 15.7 G/DL (ref 13.3–17.7)
LDLC SERPL CALC-MCNC: 167 MG/DL
NONHDLC SERPL-MCNC: 208 MG/DL
POTASSIUM SERPL-SCNC: 4 MMOL/L (ref 3.4–5.3)
PROT SERPL-MCNC: 8.3 G/DL (ref 6.8–8.8)
PSA SERPL-ACNC: 0.37 UG/L (ref 0–4)
SODIUM SERPL-SCNC: 139 MMOL/L (ref 133–144)
TRIGL SERPL-MCNC: 205 MG/DL

## 2019-10-03 PROCEDURE — G0103 PSA SCREENING: HCPCS | Performed by: PHYSICIAN ASSISTANT

## 2019-10-03 PROCEDURE — 36415 COLL VENOUS BLD VENIPUNCTURE: CPT | Performed by: PHYSICIAN ASSISTANT

## 2019-10-03 PROCEDURE — 85018 HEMOGLOBIN: CPT | Performed by: PHYSICIAN ASSISTANT

## 2019-10-03 PROCEDURE — 99396 PREV VISIT EST AGE 40-64: CPT | Performed by: PHYSICIAN ASSISTANT

## 2019-10-03 PROCEDURE — 80061 LIPID PANEL: CPT | Performed by: PHYSICIAN ASSISTANT

## 2019-10-03 PROCEDURE — 80053 COMPREHEN METABOLIC PANEL: CPT | Performed by: PHYSICIAN ASSISTANT

## 2019-10-03 RX ORDER — LISINOPRIL AND HYDROCHLOROTHIAZIDE 12.5; 2 MG/1; MG/1
2 TABLET ORAL DAILY
Qty: 180 TABLET | Refills: 1 | Status: SHIPPED | OUTPATIENT
Start: 2019-10-03 | End: 2020-04-02

## 2019-10-03 ASSESSMENT — ENCOUNTER SYMPTOMS
CONSTIPATION: 0
NAUSEA: 0
FREQUENCY: 0
SHORTNESS OF BREATH: 0
HEMATOCHEZIA: 0
ABDOMINAL PAIN: 0
JOINT SWELLING: 0
DYSURIA: 0
HEMATURIA: 0
PALPITATIONS: 0
FEVER: 0
NERVOUS/ANXIOUS: 0
EYE PAIN: 0
ARTHRALGIAS: 0
WEAKNESS: 0
HEARTBURN: 0
COUGH: 0
PARESTHESIAS: 0
HEADACHES: 0
CHILLS: 0
DIZZINESS: 0
MYALGIAS: 0
SORE THROAT: 0
DIARRHEA: 0

## 2019-10-03 ASSESSMENT — ANXIETY QUESTIONNAIRES
5. BEING SO RESTLESS THAT IT IS HARD TO SIT STILL: NOT AT ALL
1. FEELING NERVOUS, ANXIOUS, OR ON EDGE: NOT AT ALL
GAD7 TOTAL SCORE: 0
2. NOT BEING ABLE TO STOP OR CONTROL WORRYING: NOT AT ALL
4. TROUBLE RELAXING: NOT AT ALL
3. WORRYING TOO MUCH ABOUT DIFFERENT THINGS: NOT AT ALL
GAD7 TOTAL SCORE: 0
7. FEELING AFRAID AS IF SOMETHING AWFUL MIGHT HAPPEN: NOT AT ALL
7. FEELING AFRAID AS IF SOMETHING AWFUL MIGHT HAPPEN: NOT AT ALL
GAD7 TOTAL SCORE: 0
6. BECOMING EASILY ANNOYED OR IRRITABLE: NOT AT ALL

## 2019-10-03 ASSESSMENT — PAIN SCALES - GENERAL: PAINLEVEL: NO PAIN (0)

## 2019-10-03 ASSESSMENT — MIFFLIN-ST. JEOR: SCORE: 1865.49

## 2019-10-04 ENCOUNTER — TELEPHONE (OUTPATIENT)
Dept: FAMILY MEDICINE | Facility: OTHER | Age: 49
End: 2019-10-04

## 2019-10-04 DIAGNOSIS — E78.5 HYPERLIPIDEMIA LDL GOAL <130: Primary | ICD-10-CM

## 2019-10-04 RX ORDER — ATORVASTATIN CALCIUM 10 MG/1
10 TABLET, FILM COATED ORAL DAILY
Qty: 90 TABLET | Refills: 1 | Status: SHIPPED | OUTPATIENT
Start: 2019-10-04 | End: 2020-04-02

## 2019-10-04 ASSESSMENT — ANXIETY QUESTIONNAIRES: GAD7 TOTAL SCORE: 0

## 2019-10-04 ASSESSMENT — PATIENT HEALTH QUESTIONNAIRE - PHQ9: SUM OF ALL RESPONSES TO PHQ QUESTIONS 1-9: 0

## 2019-10-04 NOTE — TELEPHONE ENCOUNTER
Patient returned call and was given message from below. He stated he will try the lipitor 10mg to see how it goes. Please send to [facility]. Patient had no other questions or comments.

## 2019-10-04 NOTE — TELEPHONE ENCOUNTER
Left message for pt to return call, when call is returned give information below.      Notes recorded by Delroy Armas PA-C on 10/4/2019 at 6:12 AM CDT  Please advise the patient that his cholesterol is elevated and I would recommend lipitor 10mg each night and recheck in 3 months.  In this time period I advise that he also change his diet and increase exercise to help this as well.  Please let me know if he agrees to start meds so that I can send a prescription and place the appropriate future orders.  Electronically signed:    Delroy Muir CMA ([facility])

## 2019-10-15 DIAGNOSIS — Z12.11 SPECIAL SCREENING FOR MALIGNANT NEOPLASMS, COLON: ICD-10-CM

## 2019-10-15 PROCEDURE — 82274 ASSAY TEST FOR BLOOD FECAL: CPT | Performed by: PHYSICIAN ASSISTANT

## 2019-10-20 LAB — HEMOCCULT STL QL IA: NEGATIVE

## 2019-10-21 ENCOUNTER — TELEPHONE (OUTPATIENT)
Dept: FAMILY MEDICINE | Facility: OTHER | Age: 49
End: 2019-10-21

## 2019-10-21 NOTE — TELEPHONE ENCOUNTER
Per provider letter written.  Maddie Tucker CMA ([facility])    Notes recorded by Delroy Armas PA-C on 10/21/2019 at 6:44 AM CDT  Please, send normal letter with a copy of results and any advice listed.  Electronically signed:    Delroy Armas PA-C

## 2019-10-21 NOTE — LETTER
October 21, 2019      Meño VAUGHN Klever  [address] [address] [place] [phone]        Dear ,    We are writing to inform you of your test results.    Your test results fall within the expected range(s) or remain unchanged from previous results.  Please continue with current treatment plan.    Results for orders placed or performed in visit on 10/15/19   Fecal colorectal cancer screen (FIT)   Result Value Ref Range    Occult Blood Scn FIT Negative NEG^Negative       If you have any questions or concerns, please call the clinic at the number listed above.       Sincerely,        Delroy Lieberman PA-C

## 2019-12-12 ENCOUNTER — TELEPHONE (OUTPATIENT)
Dept: ONCOLOGY | Facility: CLINIC | Age: 49
End: 2019-12-12

## 2019-12-12 NOTE — TELEPHONE ENCOUNTER
This call was regarding father Dash. Encounter opened in error. Please see Dash Lester telephone encounter.

## 2019-12-12 NOTE — TELEPHONE ENCOUNTER
Reason for Call:  Other call back    Detailed comments: Pt stated that Edwina Called yesterday about FMLA paperwork. I see nothing in the chart about this. Can someone please call him. Thanks      Phone Number Patient can be reached at: Home number on file [phone] (home)    Best Time: NA    Can we leave a detailed message on this number? YES    Call taken on 12/12/2019 at 8:43 AM by Apurva Rincon

## 2020-03-03 DIAGNOSIS — K21.9 GASTROESOPHAGEAL REFLUX DISEASE, ESOPHAGITIS PRESENCE NOT SPECIFIED: ICD-10-CM

## 2020-03-03 NOTE — LETTER
[address] [address] [phone]          March 6, 2020    Meño Ernst                                                                                                                     [address] [place] [phone]            Dear Dorothy Wilder we were unable to reach you by phone. We were trying to reach you to let you know your prescription for omeprazole has been approved. You will need an office visit for any future refills. Please call [phone] with any questions or to make any follow up appointments.            Sincerely,         Delroy Lieberman PA-C

## 2020-03-04 RX ORDER — OMEPRAZOLE 40 MG/1
CAPSULE, DELAYED RELEASE ORAL
Qty: 90 CAPSULE | Refills: 1 | Status: SHIPPED | OUTPATIENT
Start: 2020-03-04 | End: 2020-08-31

## 2020-03-04 NOTE — TELEPHONE ENCOUNTER
LMTC, please inform patient of message below and assist in scheduling   Thanks  Cynthia Gould RT (R)

## 2020-03-04 NOTE — TELEPHONE ENCOUNTER
Pending Prescriptions:                       Disp   Refills    omeprazole (PRILOSEC) 40 MG DR capsule [P*90 cap*1            Sig: TAKE 1 CAPSULE BY MOUTH DAILY, 30 TO 60 MINUTES           BEFORE A MEAL.    Prescription approved per [facility] Refill Protocol.    Patient due for OV

## 2020-03-04 NOTE — TELEPHONE ENCOUNTER
"Pending Prescriptions:                       Disp   Refills    omeprazole (PRILOSEC) 40 MG DR capsule [P*90 cap*1            Sig: TAKE 1 CAPSULE BY MOUTH DAILY, 30 TO 60 MINUTES           BEFORE A MEAL.    Prescription approved per [facility] Refill Protocol.    Patient due for \"routine OV\" per last provider OV note. Please call and help schedule.     Gabriela Christensen RN BSN    "

## 2020-03-05 NOTE — TELEPHONE ENCOUNTER
Left message for pt to return call, when call is returned give information below and help schedule.      Stephanie Muir CMA ([facility])

## 2020-03-06 NOTE — TELEPHONE ENCOUNTER
Max attempts reached. Sending letter advising patient of refill and to contact clinic to schedule appointment for future refills.    Edwina Rincon MA

## 2020-04-02 DIAGNOSIS — I10 HYPERTENSION, GOAL BELOW 140/90: ICD-10-CM

## 2020-04-02 DIAGNOSIS — E78.5 HYPERLIPIDEMIA LDL GOAL <130: ICD-10-CM

## 2020-04-02 RX ORDER — LISINOPRIL AND HYDROCHLOROTHIAZIDE 12.5; 2 MG/1; MG/1
TABLET ORAL
Qty: 180 TABLET | Refills: 1 | Status: SHIPPED | OUTPATIENT
Start: 2020-04-02 | End: 2020-10-12

## 2020-04-02 RX ORDER — ATORVASTATIN CALCIUM 10 MG/1
TABLET, FILM COATED ORAL
Qty: 90 TABLET | Refills: 1 | Status: SHIPPED | OUTPATIENT
Start: 2020-04-02 | End: 2020-09-15

## 2020-08-30 DIAGNOSIS — K21.9 GASTROESOPHAGEAL REFLUX DISEASE, ESOPHAGITIS PRESENCE NOT SPECIFIED: ICD-10-CM

## 2020-08-31 RX ORDER — OMEPRAZOLE 40 MG/1
CAPSULE, DELAYED RELEASE ORAL
Qty: 90 CAPSULE | Refills: 1 | Status: SHIPPED | OUTPATIENT
Start: 2020-08-31 | End: 2021-02-10

## 2020-09-14 DIAGNOSIS — E78.5 HYPERLIPIDEMIA LDL GOAL <130: ICD-10-CM

## 2020-09-15 RX ORDER — ATORVASTATIN CALCIUM 10 MG/1
TABLET, FILM COATED ORAL
Qty: 90 TABLET | Refills: 1 | Status: SHIPPED | OUTPATIENT
Start: 2020-09-15 | End: 2021-03-18

## 2020-10-12 DIAGNOSIS — I10 HYPERTENSION, GOAL BELOW 140/90: ICD-10-CM

## 2020-10-12 RX ORDER — LISINOPRIL AND HYDROCHLOROTHIAZIDE 12.5; 2 MG/1; MG/1
TABLET ORAL
Qty: 180 TABLET | Refills: 0 | Status: SHIPPED | OUTPATIENT
Start: 2020-10-12 | End: 2021-03-18

## 2020-10-12 NOTE — TELEPHONE ENCOUNTER
Pending Prescriptions:                       Disp   Refills    lisinopril-hydrochlorothiazide (ZESTORETI*180 ta*1            Sig: TAKE TWO TABLETS BY MOUTH ONCE DAILY    Medication is being filled for 1 time gerber refill only due to:  Patient is due for BP check/physical.     Please call and help schedule.  Thank you!    Emmie Edgar RN  October 12, 2020

## 2020-10-12 NOTE — LETTER
[facility]  [address] [place] [address] [phone]          October 26, 2020    Meño Ernst                                                                                                                     [address] [place] [phone]            Dear Meño,    We have tried to contact you by phone but have been unable to connect with you. We wanted to let you know that we were able to fill your medication but will need to see your for physical and blood pressure check before your next refill is due.     If you have any questions please call us at [phone].    Sincerely,     [facility] Team

## 2020-10-16 NOTE — TELEPHONE ENCOUNTER
Called patient and left a message. Need to schedule a BP Check and Physical per note below. Thank you

## 2021-02-10 DIAGNOSIS — K21.00 GASTROESOPHAGEAL REFLUX DISEASE WITH ESOPHAGITIS, UNSPECIFIED WHETHER HEMORRHAGE: Primary | ICD-10-CM

## 2021-02-10 NOTE — LETTER
[address]  [facility] [address]  Phone: [phone]    02/14/21    Meño Ernst  [address] [place] [phone]      Dear Meño,      We have tried to reach you via phone without success.  We are writing to inform you that your medication for Prilosec has been refilled for 1 month only.  You are due for a follow up in clinic prior to any further refills.  Please call to schedule.      Sincerely,      [facility]

## 2021-02-10 NOTE — TELEPHONE ENCOUNTER
Pending Prescriptions:                       Disp   Refills    omeprazole (PRILOSEC) 40 MG DR capsule     90 cap*1            Routing refill request to provider for review/approval because:  Patient needs to be seen because it has been more than 1 year since last office visit.    Cynthia Roth RN on 2/10/2021 at 4:10 PM

## 2021-02-11 ENCOUNTER — TELEPHONE (OUTPATIENT)
Dept: FAMILY MEDICINE | Facility: OTHER | Age: 51
End: 2021-02-11

## 2021-02-11 RX ORDER — OMEPRAZOLE 40 MG/1
40 CAPSULE, DELAYED RELEASE ORAL DAILY
Qty: 30 CAPSULE | Refills: 0 | Status: SHIPPED | OUTPATIENT
Start: 2021-02-11 | End: 2021-03-18

## 2021-02-11 NOTE — TELEPHONE ENCOUNTER
Qty limit exceeded - Requires PA  Max qty of 90 in 365 days    Prior Authorization Retail Medication Request    Medication/Dose: omeprazole (PRILOSEC) 40 MG DR capsule  ICD code (if different than what is on RX):    Previously Tried and Failed:    Rationale:      Insurance Name:    Insurance ID:        Pharmacy Information (if different than what is on RX)  Name:    Phone:

## 2021-02-12 DIAGNOSIS — I10 HYPERTENSION, GOAL BELOW 140/90: ICD-10-CM

## 2021-02-12 RX ORDER — LISINOPRIL AND HYDROCHLOROTHIAZIDE 12.5; 2 MG/1; MG/1
TABLET ORAL
Qty: 180 TABLET | Refills: 0 | OUTPATIENT
Start: 2021-02-12

## 2021-02-12 NOTE — TELEPHONE ENCOUNTER
JUAN M for Yuriy to call back to schedule an appt. Before refill on his medication can be granted.

## 2021-02-12 NOTE — TELEPHONE ENCOUNTER
No further refills without face to face office visit.  Electronically signed:    Delroy Lieberman PA-C

## 2021-02-14 NOTE — TELEPHONE ENCOUNTER
LM for patient to return phone call to clinic about message below.  Letter sent.    Shirlene Recio CMA ([facility])

## 2021-02-16 NOTE — TELEPHONE ENCOUNTER
Prior Authorization Approval    Authorization Effective Date: 2/16/2021  Authorization Expiration Date: 2/16/2024  Medication: omeprazole (PRILOSEC) 40 MG DR capsule  Approved Dose/Quantity:   Reference #:     Insurance Company: [employer] [phone] Fax [phone]  Expected CoPay:       CoPay Card Available:      Foundation Assistance Needed:    Which Pharmacy is filling the prescription (Not needed for infusion/clinic administered): [id number] - [place], [place] - [address] [facility]  Pharmacy Notified: Yes  Patient Notified: Yes, **Instructed pharmacy to notify patient when script is ready to /ship.**

## 2021-02-16 NOTE — TELEPHONE ENCOUNTER
PA Initiation    Medication: omeprazole (PRILOSEC) 40 MG DR capsule  Insurance Company: [employer] - Phone [phone] Fax [phone]  Pharmacy Filling the Rx: [facility] #[id number] - [place] - [address] [facility]  Filling Pharmacy Phone: [phone]  Filling Pharmacy Fax: [phone]  Start Date: 2/16/2021

## 2021-03-01 DIAGNOSIS — I10 HYPERTENSION, GOAL BELOW 140/90: ICD-10-CM

## 2021-03-01 RX ORDER — LISINOPRIL AND HYDROCHLOROTHIAZIDE 12.5; 2 MG/1; MG/1
TABLET ORAL
Qty: 180 TABLET | Refills: 0 | OUTPATIENT
Start: 2021-03-01

## 2021-03-01 NOTE — TELEPHONE ENCOUNTER
LM -  He needs to schedule an appt. B/4 refills/  Please route to Delroy nelson if patient schedules

## 2021-03-18 ENCOUNTER — OFFICE VISIT (OUTPATIENT)
Dept: FAMILY MEDICINE | Facility: OTHER | Age: 51
End: 2021-03-18
Payer: COMMERCIAL

## 2021-03-18 VITALS
WEIGHT: 213 LBS | TEMPERATURE: 97.8 F | HEIGHT: 71 IN | SYSTOLIC BLOOD PRESSURE: 136 MMHG | DIASTOLIC BLOOD PRESSURE: 86 MMHG | OXYGEN SATURATION: 96 % | RESPIRATION RATE: 20 BRPM | HEART RATE: 82 BPM | BODY MASS INDEX: 29.82 KG/M2

## 2021-03-18 DIAGNOSIS — Z11.59 NEED FOR HEPATITIS C SCREENING TEST: ICD-10-CM

## 2021-03-18 DIAGNOSIS — Z11.4 SCREENING FOR HIV (HUMAN IMMUNODEFICIENCY VIRUS): ICD-10-CM

## 2021-03-18 DIAGNOSIS — Z23 NEED FOR PROPHYLACTIC VACCINATION AND INOCULATION AGAINST INFLUENZA: ICD-10-CM

## 2021-03-18 DIAGNOSIS — E78.5 HYPERLIPIDEMIA LDL GOAL <130: Primary | ICD-10-CM

## 2021-03-18 DIAGNOSIS — I10 HYPERTENSION, GOAL BELOW 140/90: ICD-10-CM

## 2021-03-18 DIAGNOSIS — Z13.220 SCREENING FOR HYPERLIPIDEMIA: ICD-10-CM

## 2021-03-18 DIAGNOSIS — K21.00 GASTROESOPHAGEAL REFLUX DISEASE WITH ESOPHAGITIS, UNSPECIFIED WHETHER HEMORRHAGE: ICD-10-CM

## 2021-03-18 PROCEDURE — 99214 OFFICE O/P EST MOD 30 MIN: CPT | Performed by: PHYSICIAN ASSISTANT

## 2021-03-18 RX ORDER — OMEPRAZOLE 40 MG/1
40 CAPSULE, DELAYED RELEASE ORAL DAILY
Qty: 90 CAPSULE | Refills: 1 | Status: SHIPPED | OUTPATIENT
Start: 2021-03-18 | End: 2021-05-14

## 2021-03-18 RX ORDER — LISINOPRIL AND HYDROCHLOROTHIAZIDE 12.5; 2 MG/1; MG/1
TABLET ORAL
Qty: 180 TABLET | Refills: 1 | Status: SHIPPED | OUTPATIENT
Start: 2021-03-18 | End: 2021-05-14

## 2021-03-18 ASSESSMENT — MIFFLIN-ST. JEOR: SCORE: 1848.29

## 2021-03-18 NOTE — PROGRESS NOTES
"Assessment & Plan     Hyperlipidemia LDL goal <130  - Albumin Random Urine Quantitative with Creat Ratio; Future  - Lipid panel reflex to direct LDL Fasting; Future  - Fecal colorectal cancer screen (FIT); Future  - Comprehensive metabolic panel; Future    Hypertension, goal below 140/90  - Albumin Random Urine Quantitative with Creat Ratio; Future  - Lipid panel reflex to direct LDL Fasting; Future  - Fecal colorectal cancer screen (FIT); Future  - Comprehensive metabolic panel; Future  - lisinopril-hydrochlorothiazide (ZESTORETIC) 20-12.5 MG tablet; TAKE TWO TABLETS BY MOUTH ONCE DAILY    Screening for hyperlipidemia  Screening for HIV (human immunodeficiency virus)  Need for hepatitis C screening test  Need for prophylactic vaccination and inoculation against influenza  Declines further intervention today.    Gastroesophageal reflux disease with esophagitis, unspecified whether hemorrhage  - omeprazole (PRILOSEC) 40 MG DR capsule; Take 1 capsule (40 mg) by mouth daily       BMI:   Estimated body mass index is 29.71 kg/m  as calculated from the following:    Height as of this encounter: 1.803 m (5' 11\").    Weight as of this encounter: 96.6 kg (213 lb).   Weight management plan: Discussed healthy diet and exercise guidelines    Work on weight loss  Regular exercise  Return in about 6 months (around 9/18/2021) for BP Recheck with provider, recheck of current condition, if symptoms do not improve.    Delroy Lieberman PA-C  [facility]     Meño Ernst is a 50 year old male who presents to clinic today for the following health issues:    History of Present Illness       Hyperlipidemia:  He presents for follow up of hyperlipidemia.  He is taking medication to lower cholesterol.     Hypertension: He presents for follow up of hypertension.  He does not check blood pressure  regularly outside of the clinic. Outside blood pressures have been over 140/90. He follows a low salt diet. " "He consumes 0 sweetened beverage(s) daily. He exercises with enough effort to increase his heart rate 3 or less days per week.   He is taking medications regularly.       Concern - Back/Kidney pain  Onset: Months   Description: Patient states that he has been having some pain on his left lower back area. Thinks it may be his kidney  Intensity: mild  Progression of Symptoms:  same and intermittent  Accompanying Signs & Symptoms: NA Previous history of similar problem:   Precipitating factors:        Worsened by: Nothing   Alleviating factors:        Improved by: NA  Therapies tried and outcome:  none       Review of Systems   Constitutional, HEENT, cardiovascular, pulmonary, GI, , musculoskeletal, neuro, skin, endocrine and psych systems are negative, except as otherwise noted.      Objective    /86   Pulse 82   Temp 97.8  F (36.6  C) (Temporal)   Resp 20   Ht 1.803 m (5' 11\")   Wt 96.6 kg (213 lb)   SpO2 96%   BMI 29.71 kg/m    Body mass index is 29.71 kg/m .  Physical Exam   GENERAL: healthy, alert and no distress  NECK: no adenopathy, no asymmetry, masses, or scars and thyroid normal to palpation  RESP: lungs clear to auscultation - no rales, rhonchi or wheezes  CV: regular rate and rhythm, normal S1 S2, no S3 or S4, no murmur, click or rub, no peripheral edema and peripheral pulses strong  ABDOMEN: soft, nontender, no hepatosplenomegaly, no masses and bowel sounds normal  MS: no gross musculoskeletal defects noted, no edema    No results found for this or any previous visit (from the past 24 hour(s)).      "

## 2021-03-22 DIAGNOSIS — I10 HYPERTENSION, GOAL BELOW 140/90: ICD-10-CM

## 2021-03-22 DIAGNOSIS — E78.5 HYPERLIPIDEMIA LDL GOAL <130: ICD-10-CM

## 2021-03-22 PROCEDURE — 82274 ASSAY TEST FOR BLOOD FECAL: CPT | Performed by: PHYSICIAN ASSISTANT

## 2021-03-24 DIAGNOSIS — E78.5 HYPERLIPIDEMIA LDL GOAL <130: ICD-10-CM

## 2021-03-24 DIAGNOSIS — E78.5 HYPERLIPIDEMIA LDL GOAL <130: Primary | ICD-10-CM

## 2021-03-24 DIAGNOSIS — I10 HYPERTENSION, GOAL BELOW 140/90: ICD-10-CM

## 2021-03-24 LAB
ALBUMIN SERPL-MCNC: 4.7 G/DL (ref 3.4–5)
ALP SERPL-CCNC: 54 U/L (ref 40–150)
ALT SERPL W P-5'-P-CCNC: 38 U/L (ref 0–70)
ANION GAP SERPL CALCULATED.3IONS-SCNC: 7 MMOL/L (ref 3–14)
AST SERPL W P-5'-P-CCNC: 14 U/L (ref 0–45)
BILIRUB SERPL-MCNC: 1.1 MG/DL (ref 0.2–1.3)
BUN SERPL-MCNC: 14 MG/DL (ref 7–30)
CALCIUM SERPL-MCNC: 9.4 MG/DL (ref 8.5–10.1)
CHLORIDE SERPL-SCNC: 102 MMOL/L (ref 94–109)
CHOLEST SERPL-MCNC: 269 MG/DL
CO2 SERPL-SCNC: 25 MMOL/L (ref 20–32)
CREAT SERPL-MCNC: 0.9 MG/DL (ref 0.66–1.25)
CREAT UR-MCNC: 36 MG/DL
GFR SERPL CREATININE-BSD FRML MDRD: >90 ML/MIN/{1.73_M2}
GLUCOSE SERPL-MCNC: 87 MG/DL (ref 70–99)
HDLC SERPL-MCNC: 43 MG/DL
LDLC SERPL CALC-MCNC: 177 MG/DL
MICROALBUMIN UR-MCNC: 16 MG/L
MICROALBUMIN/CREAT UR: 44.69 MG/G CR (ref 0–17)
NONHDLC SERPL-MCNC: 226 MG/DL
POTASSIUM SERPL-SCNC: 3.6 MMOL/L (ref 3.4–5.3)
PROT SERPL-MCNC: 8.3 G/DL (ref 6.8–8.8)
SODIUM SERPL-SCNC: 134 MMOL/L (ref 133–144)
TRIGL SERPL-MCNC: 247 MG/DL

## 2021-03-24 PROCEDURE — 82043 UR ALBUMIN QUANTITATIVE: CPT | Performed by: PHYSICIAN ASSISTANT

## 2021-03-24 PROCEDURE — 36415 COLL VENOUS BLD VENIPUNCTURE: CPT | Performed by: PHYSICIAN ASSISTANT

## 2021-03-24 PROCEDURE — 80061 LIPID PANEL: CPT | Performed by: PHYSICIAN ASSISTANT

## 2021-03-24 PROCEDURE — 80053 COMPREHEN METABOLIC PANEL: CPT | Performed by: PHYSICIAN ASSISTANT

## 2021-03-24 RX ORDER — ATORVASTATIN CALCIUM 20 MG/1
20 TABLET, FILM COATED ORAL DAILY
Qty: 90 TABLET | Refills: 1 | Status: SHIPPED | OUTPATIENT
Start: 2021-03-24 | End: 2022-09-01 | Stop reason: SINTOL

## 2021-03-27 LAB — HEMOCCULT STL QL IA: NEGATIVE

## 2021-05-08 ENCOUNTER — HEALTH MAINTENANCE LETTER (OUTPATIENT)
Age: 51
End: 2021-05-08

## 2021-05-14 DIAGNOSIS — K21.00 GASTROESOPHAGEAL REFLUX DISEASE WITH ESOPHAGITIS, UNSPECIFIED WHETHER HEMORRHAGE: ICD-10-CM

## 2021-05-14 DIAGNOSIS — I10 HYPERTENSION, GOAL BELOW 140/90: ICD-10-CM

## 2021-05-14 RX ORDER — LISINOPRIL AND HYDROCHLOROTHIAZIDE 12.5; 2 MG/1; MG/1
TABLET ORAL
Qty: 180 TABLET | Refills: 1 | Status: SHIPPED | OUTPATIENT
Start: 2021-05-14 | End: 2022-09-08

## 2021-05-14 RX ORDER — OMEPRAZOLE 40 MG/1
40 CAPSULE, DELAYED RELEASE ORAL DAILY
Qty: 90 CAPSULE | Refills: 1 | Status: SHIPPED | OUTPATIENT
Start: 2021-05-14 | End: 2021-11-11

## 2021-10-23 ENCOUNTER — HEALTH MAINTENANCE LETTER (OUTPATIENT)
Age: 51
End: 2021-10-23

## 2021-11-11 DIAGNOSIS — K21.00 GASTROESOPHAGEAL REFLUX DISEASE WITH ESOPHAGITIS, UNSPECIFIED WHETHER HEMORRHAGE: ICD-10-CM

## 2021-11-11 RX ORDER — OMEPRAZOLE 40 MG/1
40 CAPSULE, DELAYED RELEASE ORAL DAILY
Qty: 90 CAPSULE | Refills: 1 | Status: SHIPPED | OUTPATIENT
Start: 2021-11-11 | End: 2022-09-08

## 2022-05-20 ENCOUNTER — TELEPHONE (OUTPATIENT)
Dept: FAMILY MEDICINE | Facility: OTHER | Age: 52
End: 2022-05-20
Payer: COMMERCIAL

## 2022-05-20 NOTE — TELEPHONE ENCOUNTER
"Pharmacy calling to follow up on refill request from 5/10/22.     RN reviewed chart and note from provider in chart states:   \"No further refills without office visit.  Electronically signed:     Delroy Lieberman PA-C\"    Pharmacy has no further questions at this time.     Patient has not yet scheduled an appointment to be seen.     Martha TRUJILLO, RN   "

## 2022-06-04 ENCOUNTER — HEALTH MAINTENANCE LETTER (OUTPATIENT)
Age: 52
End: 2022-06-04

## 2022-08-29 ASSESSMENT — ENCOUNTER SYMPTOMS
DIARRHEA: 0
FREQUENCY: 0
WEAKNESS: 0
JOINT SWELLING: 0
SHORTNESS OF BREATH: 0
FEVER: 0
MYALGIAS: 0
ABDOMINAL PAIN: 0
NERVOUS/ANXIOUS: 0
COUGH: 0
PALPITATIONS: 0
CONSTIPATION: 0
HEARTBURN: 0
CHILLS: 0
SORE THROAT: 0
HEMATURIA: 0
HEMATOCHEZIA: 0
PARESTHESIAS: 0
ARTHRALGIAS: 0
HEADACHES: 0
DYSURIA: 0
EYE PAIN: 0
NAUSEA: 0

## 2022-09-01 ENCOUNTER — OFFICE VISIT (OUTPATIENT)
Dept: FAMILY MEDICINE | Facility: OTHER | Age: 52
End: 2022-09-01
Payer: COMMERCIAL

## 2022-09-01 VITALS
TEMPERATURE: 97.8 F | BODY MASS INDEX: 30.85 KG/M2 | SYSTOLIC BLOOD PRESSURE: 138 MMHG | HEART RATE: 78 BPM | RESPIRATION RATE: 16 BRPM | WEIGHT: 215.5 LBS | OXYGEN SATURATION: 96 % | HEIGHT: 70 IN | DIASTOLIC BLOOD PRESSURE: 84 MMHG

## 2022-09-01 DIAGNOSIS — Z12.11 SCREEN FOR COLON CANCER: ICD-10-CM

## 2022-09-01 DIAGNOSIS — Z00.00 ROUTINE HISTORY AND PHYSICAL EXAMINATION OF ADULT: Primary | ICD-10-CM

## 2022-09-01 DIAGNOSIS — Z12.5 SCREENING FOR PROSTATE CANCER: ICD-10-CM

## 2022-09-01 DIAGNOSIS — N52.8 OTHER MALE ERECTILE DYSFUNCTION: ICD-10-CM

## 2022-09-01 DIAGNOSIS — K21.00 GASTROESOPHAGEAL REFLUX DISEASE WITH ESOPHAGITIS, UNSPECIFIED WHETHER HEMORRHAGE: ICD-10-CM

## 2022-09-01 DIAGNOSIS — E78.5 HYPERLIPIDEMIA LDL GOAL <130: ICD-10-CM

## 2022-09-01 DIAGNOSIS — Z11.4 SCREENING FOR HIV (HUMAN IMMUNODEFICIENCY VIRUS): ICD-10-CM

## 2022-09-01 DIAGNOSIS — Z11.59 NEED FOR HEPATITIS C SCREENING TEST: ICD-10-CM

## 2022-09-01 DIAGNOSIS — I10 HYPERTENSION, GOAL BELOW 140/90: ICD-10-CM

## 2022-09-01 LAB
ALBUMIN SERPL-MCNC: 4.6 G/DL (ref 3.4–5)
ALP SERPL-CCNC: 66 U/L (ref 40–150)
ALT SERPL W P-5'-P-CCNC: 53 U/L (ref 0–70)
ANION GAP SERPL CALCULATED.3IONS-SCNC: 8 MMOL/L (ref 3–14)
AST SERPL W P-5'-P-CCNC: 25 U/L (ref 0–45)
BILIRUB SERPL-MCNC: 1.2 MG/DL (ref 0.2–1.3)
BUN SERPL-MCNC: 11 MG/DL (ref 7–30)
CALCIUM SERPL-MCNC: 8.7 MG/DL (ref 8.5–10.1)
CHLORIDE BLD-SCNC: 106 MMOL/L (ref 94–109)
CHOLEST SERPL-MCNC: 232 MG/DL
CO2 SERPL-SCNC: 22 MMOL/L (ref 20–32)
CREAT SERPL-MCNC: 0.89 MG/DL (ref 0.66–1.25)
CREAT UR-MCNC: 62 MG/DL
ERYTHROCYTE [DISTWIDTH] IN BLOOD BY AUTOMATED COUNT: 12.7 % (ref 10–15)
FASTING STATUS PATIENT QL REPORTED: YES
GFR SERPL CREATININE-BSD FRML MDRD: >90 ML/MIN/1.73M2
GLUCOSE BLD-MCNC: 80 MG/DL (ref 70–99)
HCT VFR BLD AUTO: 47.7 % (ref 40–53)
HDLC SERPL-MCNC: 36 MG/DL
HGB BLD-MCNC: 16.7 G/DL (ref 13.3–17.7)
LDLC SERPL CALC-MCNC: 170 MG/DL
MCH RBC QN AUTO: 31 PG (ref 26.5–33)
MCHC RBC AUTO-ENTMCNC: 35 G/DL (ref 31.5–36.5)
MCV RBC AUTO: 89 FL (ref 78–100)
MICROALBUMIN UR-MCNC: 18 MG/L
MICROALBUMIN/CREAT UR: 29.03 MG/G CR (ref 0–17)
NONHDLC SERPL-MCNC: 196 MG/DL
PLATELET # BLD AUTO: 234 10E3/UL (ref 150–450)
POTASSIUM BLD-SCNC: 3.7 MMOL/L (ref 3.4–5.3)
PROT SERPL-MCNC: 8.2 G/DL (ref 6.8–8.8)
PSA SERPL-MCNC: 0.38 UG/L (ref 0–4)
RBC # BLD AUTO: 5.38 10E6/UL (ref 4.4–5.9)
SODIUM SERPL-SCNC: 136 MMOL/L (ref 133–144)
TRIGL SERPL-MCNC: 132 MG/DL
WBC # BLD AUTO: 9.8 10E3/UL (ref 4–11)

## 2022-09-01 PROCEDURE — 99214 OFFICE O/P EST MOD 30 MIN: CPT | Mod: 25 | Performed by: PHYSICIAN ASSISTANT

## 2022-09-01 PROCEDURE — 99396 PREV VISIT EST AGE 40-64: CPT | Performed by: PHYSICIAN ASSISTANT

## 2022-09-01 PROCEDURE — 85027 COMPLETE CBC AUTOMATED: CPT | Performed by: PHYSICIAN ASSISTANT

## 2022-09-01 PROCEDURE — 80061 LIPID PANEL: CPT | Performed by: PHYSICIAN ASSISTANT

## 2022-09-01 PROCEDURE — 82043 UR ALBUMIN QUANTITATIVE: CPT | Performed by: PHYSICIAN ASSISTANT

## 2022-09-01 PROCEDURE — 36415 COLL VENOUS BLD VENIPUNCTURE: CPT | Performed by: PHYSICIAN ASSISTANT

## 2022-09-01 PROCEDURE — G0103 PSA SCREENING: HCPCS | Performed by: PHYSICIAN ASSISTANT

## 2022-09-01 PROCEDURE — 80053 COMPREHEN METABOLIC PANEL: CPT | Performed by: PHYSICIAN ASSISTANT

## 2022-09-01 ASSESSMENT — ENCOUNTER SYMPTOMS
PARESTHESIAS: 0
HEMATOCHEZIA: 0
HEADACHES: 0
ARTHRALGIAS: 0
NERVOUS/ANXIOUS: 0
HEMATURIA: 0
DYSURIA: 0
CHILLS: 0
HEARTBURN: 0
NAUSEA: 0
EYE PAIN: 0
MYALGIAS: 0
COUGH: 0
PALPITATIONS: 0
SORE THROAT: 0
FREQUENCY: 0
CONSTIPATION: 0
SHORTNESS OF BREATH: 0
JOINT SWELLING: 0
ABDOMINAL PAIN: 0
WEAKNESS: 0
FEVER: 0
DIARRHEA: 0

## 2022-09-01 ASSESSMENT — PAIN SCALES - GENERAL: PAINLEVEL: NO PAIN (0)

## 2022-09-01 NOTE — PROGRESS NOTES
SUBJECTIVE:   CC: Meño Ernst is an 52 year old male who presents for preventative health visit.       Patient has been advised of split billing requirements and indicates understanding: Yes  Healthy Habits:     Getting at least 3 servings of Calcium per day:  Yes    Bi-annual eye exam:  NO    Dental care twice a year:  Yes    Sleep apnea or symptoms of sleep apnea:  None    Diet:  Regular (no restrictions)    Frequency of exercise:  2-3 days/week    Duration of exercise:  30-45 minutes    Taking medications regularly:  Yes    Medication side effects:  None    PHQ-2 Total Score: 0    Additional concerns today:  Yes              Today's PHQ-2 Score:   PHQ-2 ( 1999 [employer]) 8/29/2022   Q1: Little interest or pleasure in doing things 0   Q2: Feeling down, depressed or hopeless 0   PHQ-2 Score 0   PHQ-2 Total Score (12-17 Years)- Positive if 3 or more points; Administer PHQ-A if positive -   Q1: Little interest or pleasure in doing things Not at all   Q2: Feeling down, depressed or hopeless Not at all   PHQ-2 Score 0       Abuse: Current or Past(Physical, Sexual or Emotional)- No  Do you feel safe in your environment? Yes    Have you ever done Advance Care Planning? (For example, a Health Directive, POLST, or a discussion with a medical provider or your loved ones about your wishes): No, advance care planning information given to patient to review.  Patient plans to discuss their wishes with loved ones or provider.      Social History     Tobacco Use     Smoking status: Former Smoker     Quit date: 7/1/2007     Years since quitting: 15.1     Smokeless tobacco: Never Used     Tobacco comment: infrequent use  in past   Substance Use Topics     Alcohol use: Yes     Alcohol/week: 6.7 standard drinks     Comment: moderate         Alcohol Use 8/29/2022   Prescreen: >3 drinks/day or >7 drinks/week? Not Applicable   Prescreen: >3 drinks/day or >7 drinks/week? -   AUDIT SCORE  -       Last PSA:   PSA   Date Value Ref Range  Status   10/03/2019 0.37 0 - 4 ug/L Final     Comment:     Assay Method:  Chemiluminescence using Siemens Vista analyzer       Reviewed orders with patient. Reviewed health maintenance and updated orders accordingly - Yes  Lab work is in process  Labs reviewed in EPIC  BP Readings from Last 3 Encounters:   09/01/22 138/84   03/18/21 136/86   10/03/19 (!) 148/98    Wt Readings from Last 3 Encounters:   09/01/22 97.8 kg (215 lb 8 oz)   03/18/21 96.6 kg (213 lb)   10/03/19 98.6 kg (217 lb 4.8 oz)                  Patient Active Problem List   Diagnosis     Impotence of organic origin     Other symptoms involving urinary system     Knee pain     Nasal congestion     Hypertension, goal below 140/90     Hyperlipidemia LDL goal <130     Other male erectile dysfunction     Gastroesophageal reflux disease with esophagitis, unspecified whether hemorrhage     Past Surgical History:   Procedure Laterality Date     REMOVAL OF SPERM DUCT(S)  04/21/1995    Vasectomy     SURGICAL HISTORY OF -       cyst removed above lt eye       Social History     Tobacco Use     Smoking status: Former Smoker     Quit date: 7/1/2007     Years since quitting: 15.1     Smokeless tobacco: Never Used     Tobacco comment: infrequent use  in past   Substance Use Topics     Alcohol use: Yes     Alcohol/week: 6.7 standard drinks     Comment: moderate     Family History   Problem Relation Age of Onset     Hypertension Father      Cancer Father      Other Cancer Father      Diabetes Paternal Grandfather      Cerebrovascular Disease Maternal Grandmother      Cerebrovascular Disease Maternal Grandfather          Current Outpatient Medications   Medication Sig Dispense Refill     lisinopril-hydrochlorothiazide (ZESTORETIC) 20-12.5 MG tablet TAKE TWO TABLETS BY MOUTH ONCE DAILY 180 tablet 1     omeprazole (PRILOSEC) 40 MG DR capsule Take 1 capsule (40 mg) by mouth daily 90 capsule 1     Allergies   Allergen Reactions     No Known Drug Allergies      Recent Labs  "  Lab Test 03/24/21  1425 10/03/19  1507 01/09/18  0808   * 167* 171*   HDL 43 43 48   TRIG 247* 205* 233*   ALT 38 47 39   CR 0.90 0.95 0.90   GFRESTIMATED >90 >90 >90   GFRESTBLACK >90 >90 >90   POTASSIUM 3.6 4.0 3.9        Reviewed and updated as needed this visit by clinical staff   Tobacco  Allergies  Meds     Fam Hx  Soc Hx          Reviewed and updated as needed this visit by Provider                   Past Medical History:   Diagnosis Date     Hypertension, goal below 140/90 12/21/2012     NO ACTIVE PROBLEMS       Past Surgical History:   Procedure Laterality Date     REMOVAL OF SPERM DUCT(S)  04/21/1995    Vasectomy     SURGICAL HISTORY OF -       cyst removed above lt eye       Review of Systems   Constitutional: Negative for chills and fever.   HENT: Negative for congestion, ear pain, hearing loss and sore throat.    Eyes: Negative for pain and visual disturbance.   Respiratory: Negative for cough and shortness of breath.    Cardiovascular: Negative for chest pain, palpitations and peripheral edema.   Gastrointestinal: Negative for abdominal pain, constipation, diarrhea, heartburn, hematochezia and nausea.   Genitourinary: Negative for dysuria, frequency, genital sores, hematuria, impotence, penile discharge and urgency.   Musculoskeletal: Negative for arthralgias, joint swelling and myalgias.   Skin: Negative for rash.   Neurological: Negative for weakness, headaches and paresthesias.   Psychiatric/Behavioral: Negative for mood changes. The patient is not nervous/anxious.        OBJECTIVE:   BP (!) 144/92 (BP Location: Left arm, Patient Position: Sitting, Cuff Size: Adult Regular)   Pulse 78   Temp 97.8  F (36.6  C) (Temporal)   Resp 16   Ht 1.778 m (5' 10\")   Wt 97.8 kg (215 lb 8 oz)   SpO2 96%   BMI 30.92 kg/m      Physical Exam  GENERAL: healthy, alert and no distress  EYES: Eyes grossly normal to inspection, PERRL and conjunctivae and sclerae normal  HENT: ear canals and TM's " normal, nose and mouth without ulcers or lesions  NECK: no adenopathy, no asymmetry, masses, or scars and thyroid normal to palpation  RESP: lungs clear to auscultation - no rales, rhonchi or wheezes  CV: regular rate and rhythm, normal S1 S2, no S3 or S4, no murmur, click or rub, no peripheral edema and peripheral pulses strong  ABDOMEN: soft, nontender, no hepatosplenomegaly, no masses and bowel sounds normal  MS: no gross musculoskeletal defects noted, no edema  SKIN: no suspicious lesions or rashes  NEURO: Normal strength and tone, mentation intact and speech normal  PSYCH: mentation appears normal, affect normal/bright    Diagnostic Test Results:  Labs reviewed in Epic  No results found for any visits on 09/01/22.    ASSESSMENT/PLAN:   Meño was seen today for physical.    Diagnoses and all orders for this visit:    Routine history and physical examination of adult  -     Lipid panel reflex to direct LDL Non-fasting; Future  -     CBC with platelets; Future  -     Comprehensive metabolic panel (BMP + Alb, Alk Phos, ALT, AST, Total. Bili, TP); Future  -     PSA, screen; Future  -     Lipid panel reflex to direct LDL Non-fasting  -     CBC with platelets  -     Comprehensive metabolic panel (BMP + Alb, Alk Phos, ALT, AST, Total. Bili, TP)  -     PSA, screen    Hypertension, goal below 140/90  -     Albumin Random Urine Quantitative with Creat Ratio; Future  -     Albumin Random Urine Quantitative with Creat Ratio    Hyperlipidemia LDL goal <130    Gastroesophageal reflux disease with esophagitis, unspecified whether hemorrhage    Other male erectile dysfunction    Screening for HIV (human immunodeficiency virus)    Need for hepatitis C screening test    Screening for prostate cancer  -     PSA, screen; Future  -     PSA, screen    Screen for colon cancer  -     Fecal colorectal cancer screen FIT - Future (S+30); Future  -     Fecal colorectal cancer screen FIT - Future (S+30)    Other orders  -     REVIEW OF  "HEALTH MAINTENANCE PROTOCOL ORDERS  -     TDAP VACCINE (Adacel, Boostrix)  -     ZOSTER VACCINE RECOMBINANT ADJUVANTED (SHINGRIX)    Patient is a 52-year-old male here for routine physical.  Repeat in 1 year.  He does have a provider screening form that is in my in basket in pod D next to the printer that needs to be completed with numerical data from his cholesterol and blood sugar.    Hypertension is under borderline control at this point in time advised that he continue to take his medications as recommended and follow-up in 6 months.  Dietary goals as well as exercise discussed.    LDL goal should probably be closer than less than 100 with the presence of hypertension.  Follow-up in 6 months depending on his recent labs.  He had a significant side effect to Lipitor and refuses to take a statin medication at all anymore.  Dietary goals discussed  Heartburn is under good control no new concerns.    Erectile function is stable at this point in time no requested further medication or intervention is done.    Patient considers himself at very low risk for HIV hepatitis C and refuses testing today.    He will allow for colon and prostate cancer screening.  Labs pending.  Patient has been advised of split billing requirements and indicates understanding: Yes    COUNSELING:   Reviewed preventive health counseling, as reflected in patient instructions       Regular exercise       Healthy diet/nutrition       Vision screening       Hearing screening       Aspirin prophylaxis        Alcohol Use        Safe sex practices/STD prevention       Consider Hep C screening for all patients one time for ages 18-79 years       HIV screeninx in teen years, 1x in adult years, and at intervals if high risk       Colorectal cancer screening       Prostate cancer screening    Estimated body mass index is 30.92 kg/m  as calculated from the following:    Height as of this encounter: 1.778 m (5' 10\").    Weight as of this encounter: " 97.8 kg (215 lb 8 oz).     Weight management plan: Discussed healthy diet and exercise guidelines    He reports that he quit smoking about 15 years ago. He has never used smokeless tobacco.      Counseling Resources:  ATP IV Guidelines  Pooled Cohorts Equation Calculator  FRAX Risk Assessment  ICSI Preventive Guidelines  Dietary Guidelines for Americans, 2010  USDA's MyPlate  ASA Prophylaxis  Lung CA Screening    Delroy Lieberman PA-C  [facility]

## 2022-09-02 ENCOUNTER — TELEPHONE (OUTPATIENT)
Dept: FAMILY MEDICINE | Facility: OTHER | Age: 52
End: 2022-09-02

## 2022-09-02 NOTE — TELEPHONE ENCOUNTER
Faxed form that was handed to me by provider to [phone]. Sent copy to scanning and mailed form to pt.

## 2022-09-06 ENCOUNTER — MYC MEDICAL ADVICE (OUTPATIENT)
Dept: FAMILY MEDICINE | Facility: OTHER | Age: 52
End: 2022-09-06

## 2022-09-06 DIAGNOSIS — K21.00 GASTROESOPHAGEAL REFLUX DISEASE WITH ESOPHAGITIS, UNSPECIFIED WHETHER HEMORRHAGE: ICD-10-CM

## 2022-09-06 DIAGNOSIS — I10 HYPERTENSION, GOAL BELOW 140/90: ICD-10-CM

## 2022-09-07 NOTE — TELEPHONE ENCOUNTER
See [employer] [facility] message.     Routing refill request to provider for review/approval because:  A break in medication      CASSANDRA Torres, RN  [facility]  September 7, 2022

## 2022-09-08 RX ORDER — LISINOPRIL AND HYDROCHLOROTHIAZIDE 12.5; 2 MG/1; MG/1
TABLET ORAL
Qty: 180 TABLET | Refills: 1 | Status: SHIPPED | OUTPATIENT
Start: 2022-09-08 | End: 2023-03-02

## 2022-09-08 RX ORDER — OMEPRAZOLE 40 MG/1
40 CAPSULE, DELAYED RELEASE ORAL DAILY
Qty: 90 CAPSULE | Refills: 1 | Status: SHIPPED | OUTPATIENT
Start: 2022-09-08 | End: 2023-03-02

## 2022-10-09 ENCOUNTER — HEALTH MAINTENANCE LETTER (OUTPATIENT)
Age: 52
End: 2022-10-09

## 2023-01-07 ENCOUNTER — LAB (OUTPATIENT)
Dept: LAB | Facility: CLINIC | Age: 53
End: 2023-01-07
Payer: COMMERCIAL

## 2023-01-07 PROCEDURE — 82274 ASSAY TEST FOR BLOOD FECAL: CPT | Performed by: PHYSICIAN ASSISTANT

## 2023-01-09 LAB — HEMOCCULT STL QL IA: NEGATIVE

## 2023-03-01 DIAGNOSIS — K21.00 GASTROESOPHAGEAL REFLUX DISEASE WITH ESOPHAGITIS, UNSPECIFIED WHETHER HEMORRHAGE: ICD-10-CM

## 2023-03-01 DIAGNOSIS — I10 HYPERTENSION, GOAL BELOW 140/90: ICD-10-CM

## 2023-03-02 RX ORDER — OMEPRAZOLE 40 MG/1
CAPSULE, DELAYED RELEASE ORAL
Qty: 90 CAPSULE | Refills: 1 | Status: SHIPPED | OUTPATIENT
Start: 2023-03-02 | End: 2023-09-01

## 2023-03-02 RX ORDER — LISINOPRIL AND HYDROCHLOROTHIAZIDE 12.5; 2 MG/1; MG/1
TABLET ORAL
Qty: 180 TABLET | Refills: 1 | Status: SHIPPED | OUTPATIENT
Start: 2023-03-02 | End: 2023-09-01

## 2023-06-22 ENCOUNTER — OFFICE VISIT (OUTPATIENT)
Dept: FAMILY MEDICINE | Facility: OTHER | Age: 53
End: 2023-06-22
Payer: COMMERCIAL

## 2023-06-22 VITALS
DIASTOLIC BLOOD PRESSURE: 88 MMHG | SYSTOLIC BLOOD PRESSURE: 134 MMHG | TEMPERATURE: 96.8 F | HEIGHT: 70 IN | OXYGEN SATURATION: 95 % | RESPIRATION RATE: 30 BRPM | BODY MASS INDEX: 29.85 KG/M2 | HEART RATE: 87 BPM | WEIGHT: 208.5 LBS

## 2023-06-22 DIAGNOSIS — M77.12 LEFT TENNIS ELBOW: Primary | ICD-10-CM

## 2023-06-22 DIAGNOSIS — K64.4 EXTERNAL HEMORRHOIDS: ICD-10-CM

## 2023-06-22 DIAGNOSIS — M67.442 GANGLION CYST OF FINGER OF LEFT HAND: ICD-10-CM

## 2023-06-22 PROCEDURE — 90471 IMMUNIZATION ADMIN: CPT | Performed by: PHYSICIAN ASSISTANT

## 2023-06-22 PROCEDURE — 99213 OFFICE O/P EST LOW 20 MIN: CPT | Mod: 25 | Performed by: PHYSICIAN ASSISTANT

## 2023-06-22 PROCEDURE — 90472 IMMUNIZATION ADMIN EACH ADD: CPT | Performed by: PHYSICIAN ASSISTANT

## 2023-06-22 PROCEDURE — 90750 HZV VACC RECOMBINANT IM: CPT | Performed by: PHYSICIAN ASSISTANT

## 2023-06-22 PROCEDURE — 90715 TDAP VACCINE 7 YRS/> IM: CPT | Performed by: PHYSICIAN ASSISTANT

## 2023-06-22 NOTE — PROGRESS NOTES
Prior to immunization administration, verified patients identity using patient s name and date of birth. Please see Immunization Activity for additional information.     Screening Questionnaire for Adult Immunization    Are you sick today?   No   Do you have allergies to medications, food, a vaccine component or latex?   No   Have you ever had a serious reaction after receiving a vaccination?   No   Do you have a long-term health problem with heart, lung, kidney, or metabolic disease (e.g., diabetes), asthma, a blood disorder, no spleen, complement component deficiency, a cochlear implant, or a spinal fluid leak?  Are you on long-term aspirin therapy?   No   Do you have cancer, leukemia, HIV/AIDS, or any other immune system problem?   No   Do you have a parent, brother, or sister with an immune system problem?   No   In the past 3 months, have you taken medications that affect  your immune system, such as prednisone, other steroids, or anticancer drugs; drugs for the treatment of rheumatoid arthritis, Crohn s disease, or psoriasis; or have you had radiation treatments?   No   Have you had a seizure, or a brain or other nervous system problem?   No   During the past year, have you received a transfusion of blood or blood    products, or been given immune (gamma) globulin or antiviral drug?   No   For women: Are you pregnant or is there a chance you could become       pregnant during the next month?   No   Have you received any vaccinations in the past 4 weeks?   No     Immunization questionnaire answers were all negative.      Patient instructed to remain in clinic for 15 minutes afterwards, and to report any adverse reactions.     Screening performed by Edwina Freeman MA on 6/22/2023 at 3:14 PM.

## 2023-06-22 NOTE — PROGRESS NOTES
"  Assessment & Plan     Left tennis elbow  Overall his signs and Symptoms have resolved from the time that he made the appointment until the presented to clinic today.  Discussed a Band for the left upper forearm with respect to this.  He can ice this area and use ibuprofen.  If not improved I would have him see Occupational Therapy.    Ganglion cyst of finger of left hand  Intermittent discomfort over the PIP of the left thumb.    External hemorrhoids  At the 9 o'clock position intermittent in nature.  We will have him see surgeon if he does not respond to radiation.  - hydrocortisone-pramoxine (PROCTOFOAM-HC) rectal foam; Place 1 Applicatorful rectally 3 times daily     BMI:   Estimated body mass index is 30.19 kg/m  as calculated from the following:    Height as of this encounter: 1.77 m (5' 9.69\").    Weight as of this encounter: 94.6 kg (208 lb 8 oz).   Weight management plan: Discussed healthy diet and exercise guidelines    Work on weight loss  Regular exercise  JOEL Mortensen [facility] [address] TEN Yan is a 52 year old, presenting for the following health issues:  Elbow Pain        6/22/2023     2:35 PM   Additional Questions   Roomed by Edwina LANDON   Accompanied by self     Elbow Pain    History of Present Illness       Reason for visit:  Pain in left arm and elbow and a hemorrhoid  Symptom onset:  More than a month  Symptoms include:  Pain in left arm elbow area also a hemorrhoid  Symptom intensity:  Moderate  Symptom progression:  Improving  Had these symptoms before:  Yes  Has tried/received treatment for these symptoms:  No  What makes it worse:  No  What makes it better:  No    He eats 2-3 servings of fruits and vegetables daily.He consumes 0 sweetened beverage(s) daily.He exercises with enough effort to increase his heart rate 20 to 29 minutes per day.  He exercises with enough effort to increase his heart rate 3 or less days per week.   He is taking medications " "regularly.     He reports no known injury or trauma to the left elbow, it is currently improving but he reports the pain sensations are constantly there but get worse with certain movements. It has improved since he made this appointment which was the end of May or beginning of June. He has not had any imaging done on the area and he has not seen anyone else for this pain. He also has a swollen thumb joint on the left hand as well, he was doing something in his truck and it \"popped back\" and that was a few months ago and wants to make sure there is no cyst or maybe its out of place?    Hemorrhoids  Onset/Duration: a few months  Description:   Selene-anal lump: YES  Pain: YES- sometimes  Itching: YES- sometimes  Accompanying Signs & Symptoms:  Blood in stool: YES- once in a while  Changes in stool pattern: No  History:   Any previous GI studies done:none (just stool samples prior but nothing else)  Family History of colon cancer: No  Precipitating factors:   None  Alleviating factors:  None  Therapies tried and outcome: keeping it clean, hemorrhoid ointment or bacitracin - somewhat helpful but not a lot but once he has another BM it seems to be irritated again        Review of Systems   Constitutional, HEENT, cardiovascular, pulmonary, GI, , musculoskeletal, neuro, skin, endocrine and psych systems are negative, except as otherwise noted.      Objective    /88   Pulse 87   Temp 96.8  F (36  C) (Temporal)   Resp 30   Ht 1.77 m (5' 9.69\")   Wt 94.6 kg (208 lb 8 oz)   SpO2 95%   BMI 30.19 kg/m    Body mass index is 30.19 kg/m .  Physical Exam   GENERAL: healthy, alert and no distress  NECK: no adenopathy, no asymmetry, masses, or scars and thyroid normal to palpation  RESP: lungs clear to auscultation - no rales, rhonchi or wheezes  CV: regular rate and rhythm, normal S1 S2, no S3 or S4, no murmur, click or rub, no peripheral edema and peripheral pulses strong  ABDOMEN: soft, nontender, no " hepatosplenomegaly, no masses and bowel sounds normal  RECTAL (male): External hemorrhoid 9 o'clock position  MS: no gross musculoskeletal defects noted, no edema  SKIN: no suspicious lesions or rashes to exposed visible skin today.

## 2023-08-02 ENCOUNTER — PATIENT OUTREACH (OUTPATIENT)
Dept: CARE COORDINATION | Facility: CLINIC | Age: 53
End: 2023-08-02
Payer: COMMERCIAL

## 2023-09-01 DIAGNOSIS — K21.00 GASTROESOPHAGEAL REFLUX DISEASE WITH ESOPHAGITIS, UNSPECIFIED WHETHER HEMORRHAGE: ICD-10-CM

## 2023-09-01 DIAGNOSIS — I10 HYPERTENSION, GOAL BELOW 140/90: ICD-10-CM

## 2023-09-01 RX ORDER — LISINOPRIL AND HYDROCHLOROTHIAZIDE 12.5; 2 MG/1; MG/1
TABLET ORAL
Qty: 180 TABLET | Refills: 0 | Status: SHIPPED | OUTPATIENT
Start: 2023-09-01 | End: 2023-09-18

## 2023-09-01 RX ORDER — OMEPRAZOLE 40 MG/1
CAPSULE, DELAYED RELEASE ORAL
Qty: 90 CAPSULE | Refills: 1 | Status: SHIPPED | OUTPATIENT
Start: 2023-09-01 | End: 2023-09-18

## 2023-09-12 ASSESSMENT — ENCOUNTER SYMPTOMS
PARESTHESIAS: 0
PALPITATIONS: 0
SORE THROAT: 0
NAUSEA: 0
CONSTIPATION: 0
HEARTBURN: 0
DIZZINESS: 0
FEVER: 0
COUGH: 0
HEMATURIA: 0
FREQUENCY: 0
CHILLS: 0
EYE PAIN: 0
DIARRHEA: 0
HEADACHES: 0
JOINT SWELLING: 0
SHORTNESS OF BREATH: 0
ARTHRALGIAS: 0
NERVOUS/ANXIOUS: 0
HEMATOCHEZIA: 0
ABDOMINAL PAIN: 0
WEAKNESS: 0
DYSURIA: 0
MYALGIAS: 0

## 2023-09-18 ENCOUNTER — OFFICE VISIT (OUTPATIENT)
Dept: FAMILY MEDICINE | Facility: OTHER | Age: 53
End: 2023-09-18
Payer: COMMERCIAL

## 2023-09-18 VITALS
HEART RATE: 74 BPM | RESPIRATION RATE: 18 BRPM | TEMPERATURE: 97.7 F | OXYGEN SATURATION: 97 % | DIASTOLIC BLOOD PRESSURE: 84 MMHG | HEIGHT: 70 IN | SYSTOLIC BLOOD PRESSURE: 130 MMHG | BODY MASS INDEX: 29.92 KG/M2 | WEIGHT: 209 LBS

## 2023-09-18 DIAGNOSIS — I10 HYPERTENSION, GOAL BELOW 140/90: ICD-10-CM

## 2023-09-18 DIAGNOSIS — E78.5 HYPERLIPIDEMIA LDL GOAL <130: ICD-10-CM

## 2023-09-18 DIAGNOSIS — I49.9 IRREGULAR HEART BEATS: ICD-10-CM

## 2023-09-18 DIAGNOSIS — K64.4 EXTERNAL HEMORRHOIDS: ICD-10-CM

## 2023-09-18 DIAGNOSIS — K21.00 GASTROESOPHAGEAL REFLUX DISEASE WITH ESOPHAGITIS, UNSPECIFIED WHETHER HEMORRHAGE: ICD-10-CM

## 2023-09-18 DIAGNOSIS — Z00.00 ROUTINE HISTORY AND PHYSICAL EXAMINATION OF ADULT: Primary | ICD-10-CM

## 2023-09-18 LAB
ALBUMIN SERPL BCG-MCNC: 5.1 G/DL (ref 3.5–5.2)
ALP SERPL-CCNC: 64 U/L (ref 40–129)
ALT SERPL W P-5'-P-CCNC: 32 U/L (ref 0–70)
ANION GAP SERPL CALCULATED.3IONS-SCNC: 12 MMOL/L (ref 7–15)
AST SERPL W P-5'-P-CCNC: 23 U/L (ref 0–45)
BILIRUB SERPL-MCNC: 0.8 MG/DL
BUN SERPL-MCNC: 13.5 MG/DL (ref 6–20)
CALCIUM SERPL-MCNC: 10 MG/DL (ref 8.6–10)
CHLORIDE SERPL-SCNC: 99 MMOL/L (ref 98–107)
CHOLEST SERPL-MCNC: 260 MG/DL
CREAT SERPL-MCNC: 0.94 MG/DL (ref 0.67–1.17)
CREAT UR-MCNC: 55 MG/DL
DEPRECATED HCO3 PLAS-SCNC: 23 MMOL/L (ref 22–29)
EGFRCR SERPLBLD CKD-EPI 2021: >90 ML/MIN/1.73M2
ERYTHROCYTE [DISTWIDTH] IN BLOOD BY AUTOMATED COUNT: 12.2 % (ref 10–15)
GLUCOSE SERPL-MCNC: 90 MG/DL (ref 70–99)
HCT VFR BLD AUTO: 46.3 % (ref 40–53)
HDLC SERPL-MCNC: 44 MG/DL
HGB BLD-MCNC: 16.5 G/DL (ref 13.3–17.7)
LDLC SERPL CALC-MCNC: 155 MG/DL
MCH RBC QN AUTO: 32 PG (ref 26.5–33)
MCHC RBC AUTO-ENTMCNC: 35.6 G/DL (ref 31.5–36.5)
MCV RBC AUTO: 90 FL (ref 78–100)
MICROALBUMIN UR-MCNC: <12 MG/L
MICROALBUMIN/CREAT UR: NORMAL MG/G{CREAT}
NONHDLC SERPL-MCNC: 216 MG/DL
PLATELET # BLD AUTO: 231 10E3/UL (ref 150–450)
POTASSIUM SERPL-SCNC: 4 MMOL/L (ref 3.4–5.3)
PROT SERPL-MCNC: 8 G/DL (ref 6.4–8.3)
PSA SERPL DL<=0.01 NG/ML-MCNC: 0.42 NG/ML (ref 0–3.5)
RBC # BLD AUTO: 5.16 10E6/UL (ref 4.4–5.9)
SODIUM SERPL-SCNC: 134 MMOL/L (ref 136–145)
TRIGL SERPL-MCNC: 304 MG/DL
WBC # BLD AUTO: 9.4 10E3/UL (ref 4–11)

## 2023-09-18 PROCEDURE — 85027 COMPLETE CBC AUTOMATED: CPT | Performed by: PHYSICIAN ASSISTANT

## 2023-09-18 PROCEDURE — 82570 ASSAY OF URINE CREATININE: CPT | Performed by: PHYSICIAN ASSISTANT

## 2023-09-18 PROCEDURE — 82043 UR ALBUMIN QUANTITATIVE: CPT | Performed by: PHYSICIAN ASSISTANT

## 2023-09-18 PROCEDURE — 80061 LIPID PANEL: CPT | Performed by: PHYSICIAN ASSISTANT

## 2023-09-18 PROCEDURE — 36415 COLL VENOUS BLD VENIPUNCTURE: CPT | Performed by: PHYSICIAN ASSISTANT

## 2023-09-18 PROCEDURE — 80053 COMPREHEN METABOLIC PANEL: CPT | Performed by: PHYSICIAN ASSISTANT

## 2023-09-18 PROCEDURE — G0103 PSA SCREENING: HCPCS | Performed by: PHYSICIAN ASSISTANT

## 2023-09-18 PROCEDURE — 90471 IMMUNIZATION ADMIN: CPT | Performed by: PHYSICIAN ASSISTANT

## 2023-09-18 PROCEDURE — 99396 PREV VISIT EST AGE 40-64: CPT | Mod: 25 | Performed by: PHYSICIAN ASSISTANT

## 2023-09-18 PROCEDURE — 90750 HZV VACC RECOMBINANT IM: CPT | Performed by: PHYSICIAN ASSISTANT

## 2023-09-18 PROCEDURE — 93000 ELECTROCARDIOGRAM COMPLETE: CPT | Performed by: PHYSICIAN ASSISTANT

## 2023-09-18 PROCEDURE — 99214 OFFICE O/P EST MOD 30 MIN: CPT | Mod: 25 | Performed by: PHYSICIAN ASSISTANT

## 2023-09-18 RX ORDER — LISINOPRIL AND HYDROCHLOROTHIAZIDE 12.5; 2 MG/1; MG/1
2 TABLET ORAL DAILY
Qty: 180 TABLET | Refills: 3 | Status: SHIPPED | OUTPATIENT
Start: 2023-09-18

## 2023-09-18 RX ORDER — OMEPRAZOLE 40 MG/1
40 CAPSULE, DELAYED RELEASE ORAL DAILY
Qty: 90 CAPSULE | Refills: 3 | Status: SHIPPED | OUTPATIENT
Start: 2023-09-18

## 2023-09-18 RX ORDER — HYDROCORTISONE 25 MG/G
CREAM TOPICAL 2 TIMES DAILY PRN
Qty: 30 G | Refills: 3 | Status: SHIPPED | OUTPATIENT
Start: 2023-09-18 | End: 2024-09-26

## 2023-09-18 ASSESSMENT — ENCOUNTER SYMPTOMS
NAUSEA: 0
PALPITATIONS: 0
SORE THROAT: 0
MYALGIAS: 0
SHORTNESS OF BREATH: 0
JOINT SWELLING: 0
HEADACHES: 0
WEAKNESS: 0
CHILLS: 0
HEMATURIA: 0
CONSTIPATION: 0
EYE PAIN: 0
DYSURIA: 0
HEARTBURN: 0
HEMATOCHEZIA: 0
ARTHRALGIAS: 0
PARESTHESIAS: 0
COUGH: 0
FEVER: 0
FREQUENCY: 0
DIZZINESS: 0
ABDOMINAL PAIN: 0
NERVOUS/ANXIOUS: 0
DIARRHEA: 0

## 2023-09-18 ASSESSMENT — PAIN SCALES - GENERAL: PAINLEVEL: NO PAIN (0)

## 2023-09-18 NOTE — NURSING NOTE
Prior to immunization administration, verified patients identity using patient s name and date of birth. Please see Immunization Activity for additional information.     Screening Questionnaire for Adult Immunization    Are you sick today?   No   Do you have allergies to medications, food, a vaccine component or latex?   No   Have you ever had a serious reaction after receiving a vaccination?   No   Do you have a long-term health problem with heart, lung, kidney, or metabolic disease (e.g., diabetes), asthma, a blood disorder, no spleen, complement component deficiency, a cochlear implant, or a spinal fluid leak?  Are you on long-term aspirin therapy?   No   Do you have cancer, leukemia, HIV/AIDS, or any other immune system problem?   No   Do you have a parent, brother, or sister with an immune system problem?   No   In the past 3 months, have you taken medications that affect  your immune system, such as prednisone, other steroids, or anticancer drugs; drugs for the treatment of rheumatoid arthritis, Crohn s disease, or psoriasis; or have you had radiation treatments?   No   Have you had a seizure, or a brain or other nervous system problem?   No   During the past year, have you received a transfusion of blood or blood    products, or been given immune (gamma) globulin or antiviral drug?   No   For women: Are you pregnant or is there a chance you could become       pregnant during the next month?   No   Have you received any vaccinations in the past 4 weeks?   No     Immunization questionnaire answers were all negative.      Patient instructed to remain in clinic for 15 minutes afterwards, and to report any adverse reactions.     Screening performed by Yue Juarez MA on 9/18/2023 at 3:07 PM.

## 2023-09-18 NOTE — PROGRESS NOTES
SUBJECTIVE:   CC: Yuriy is an 53 year old who presents for preventative health visit.       9/18/2023     2:35 PM   Additional Questions   Roomed by Samantha   Accompanied by self       Healthy Habits:     Getting at least 3 servings of Calcium per day:  Yes    Bi-annual eye exam:  NO    Dental care twice a year:  Yes    Sleep apnea or symptoms of sleep apnea:  None    Diet:  Regular (no restrictions)    Frequency of exercise:  2-3 days/week    Duration of exercise:  15-30 minutes    Taking medications regularly:  Yes    Additional concerns today:  No      Social History     Tobacco Use    Smoking status: Former     Types: Dip, chew, snus or snuff    Smokeless tobacco: Former    Tobacco comments:     infrequent use  in past   Substance Use Topics    Alcohol use: Yes     Alcohol/week: 6.7 standard drinks of alcohol     Comment: moderate             9/12/2023     6:52 AM   Alcohol Use   Prescreen: >3 drinks/day or >7 drinks/week? Not Applicable       Last PSA:   PSA   Date Value Ref Range Status   10/03/2019 0.37 0 - 4 ug/L Final     Comment:     Assay Method:  Chemiluminescence using Siemens Vista analyzer     Prostate Specific Antigen Screen   Date Value Ref Range Status   09/01/2022 0.38 0.00 - 4.00 ug/L Final       Reviewed orders with patient. Reviewed health maintenance and updated orders accordingly - Yes  Lab work is in process  Labs reviewed in EPIC  BP Readings from Last 3 Encounters:   09/18/23 130/84   06/22/23 134/88   09/01/22 138/84    Wt Readings from Last 3 Encounters:   09/18/23 94.8 kg (209 lb)   06/22/23 94.6 kg (208 lb 8 oz)   09/01/22 97.8 kg (215 lb 8 oz)                  Patient Active Problem List   Diagnosis    Impotence of organic origin    Other symptoms involving urinary system    Knee pain    Nasal congestion    Hypertension, goal below 140/90    Hyperlipidemia LDL goal <130    Other male erectile dysfunction    Gastroesophageal reflux disease with esophagitis, unspecified whether hemorrhage     External hemorrhoids    Irregular heart beats     Past Surgical History:   Procedure Laterality Date    REMOVAL OF SPERM DUCT(S)  04/21/1995    Vasectomy    SURGICAL HISTORY OF -       cyst removed above lt eye       Social History     Tobacco Use    Smoking status: Former     Types: Dip, chew, snus or snuff    Smokeless tobacco: Former    Tobacco comments:     infrequent use  in past   Substance Use Topics    Alcohol use: Yes     Alcohol/week: 6.7 standard drinks of alcohol     Comment: moderate     Family History   Problem Relation Age of Onset    Hypertension Father     Cancer Father     Other Cancer Father     Diabetes Paternal Grandfather     Cerebrovascular Disease Maternal Grandmother     Cerebrovascular Disease Maternal Grandfather          Current Outpatient Medications   Medication Sig Dispense Refill    hydrocortisone, Perianal, (HYDROCORTISONE) 2.5 % cream Place rectally 2 times daily as needed for hemorrhoids 30 g 3    lisinopril-hydrochlorothiazide (ZESTORETIC) 20-12.5 MG tablet Take 2 tablets by mouth daily 180 tablet 3    omeprazole (PRILOSEC) 40 MG DR capsule Take 1 capsule (40 mg) by mouth daily 90 capsule 3     Allergies   Allergen Reactions    No Known Drug Allergy      Recent Labs   Lab Test 09/01/22  1508 03/24/21  1425 10/03/19  1507   * 177* 167*   HDL 36* 43 43   TRIG 132 247* 205*   ALT 53 38 47   CR 0.89 0.90 0.95   GFRESTIMATED >90 >90 >90   GFRESTBLACK  --  >90 >90   POTASSIUM 3.7 3.6 4.0        Reviewed and updated as needed this visit by clinical staff   Tobacco  Allergies  Meds              Reviewed and updated as needed this visit by Provider                 Past Medical History:   Diagnosis Date    Hypertension, goal below 140/90 12/21/2012    NO ACTIVE PROBLEMS       Past Surgical History:   Procedure Laterality Date    REMOVAL OF SPERM DUCT(S)  04/21/1995    Vasectomy    SURGICAL HISTORY OF -       cyst removed above lt eye       Review of Systems   Constitutional:   "Negative for chills and fever.   HENT:  Negative for congestion, ear pain, hearing loss and sore throat.    Eyes:  Negative for pain.   Respiratory:  Negative for cough and shortness of breath.    Cardiovascular:  Negative for chest pain, palpitations and peripheral edema.   Gastrointestinal:  Negative for abdominal pain, constipation, diarrhea, heartburn, hematochezia and nausea.   Genitourinary:  Negative for dysuria, frequency, genital sores, hematuria, impotence, penile discharge and urgency.   Musculoskeletal:  Negative for arthralgias, joint swelling and myalgias.   Skin:  Negative for rash.   Neurological:  Negative for dizziness, weakness, headaches and paresthesias.   Psychiatric/Behavioral:  Negative for mood changes. The patient is not nervous/anxious.      OBJECTIVE:   /84   Pulse 74   Temp 97.7  F (36.5  C) (Temporal)   Resp 18   Ht 1.778 m (5' 10\")   Wt 94.8 kg (209 lb)   SpO2 97%   BMI 29.99 kg/m      Physical Exam  GENERAL: healthy, alert and no distress  EYES: Eyes grossly normal to inspection, PERRL and conjunctivae and sclerae normal  HENT: ear canals and TM's normal, nose and mouth without ulcers or lesions  NECK: no adenopathy, no asymmetry, masses, or scars and thyroid normal to palpation  RESP: lungs clear to auscultation - no rales, rhonchi or wheezes  CV: occasional premature beats, normal S1 S2, no S3 or S4, no murmur, click or rub, peripheral pulses strong, and no peripheral edema  ABDOMEN: soft, nontender, no hepatosplenomegaly, no masses and bowel sounds normal  MS: no gross musculoskeletal defects noted, no edema  SKIN: no suspicious lesions or rashes  NEURO: Normal strength and tone, mentation intact and speech normal  PSYCH: mentation appears normal, affect normal/bright    Diagnostic Test Results:  Labs reviewed in Epic    Office EKG demonstrates:  Normal Sinus Rhythm, nonspecific intraventricular conduction delay see attached result.  Ectopic beats " noted.    ASSESSMENT/PLAN:   Meño was seen today for physical.    Diagnoses and all orders for this visit:    Routine history and physical examination of adult  -     CBC with platelets; Future  -     Comprehensive metabolic panel (BMP + Alb, Alk Phos, ALT, AST, Total. Bili, TP); Future  -     PSA, screen; Future    Hypertension, goal below 140/90  -     Albumin Random Urine Quantitative with Creat Ratio; Future  -     lisinopril-hydrochlorothiazide (ZESTORETIC) 20-12.5 MG tablet; Take 2 tablets by mouth daily  -     Comprehensive metabolic panel (BMP + Alb, Alk Phos, ALT, AST, Total. Bili, TP); Future    Hyperlipidemia LDL goal <130  -     Lipid panel reflex to direct LDL Fasting; Future    Gastroesophageal reflux disease with esophagitis, unspecified whether hemorrhage  -     omeprazole (PRILOSEC) 40 MG DR capsule; Take 1 capsule (40 mg) by mouth daily    External hemorrhoids  -     hydrocortisone, Perianal, (HYDROCORTISONE) 2.5 % cream; Place rectally 2 times daily as needed for hemorrhoids    Irregular heart beats  -     EKG 12-lead complete w/read - Clinics    Other orders  -     REVIEW OF HEALTH MAINTENANCE PROTOCOL ORDERS  -     ZOSTER RECOMBINANT ADJUVANTED (SHINGRIX)      53-year-old male here for routine physical.  Repeat in 1 year.    Blood pressure under good control no new concerns.  Follow-up in 6 months encouraged.    LDL cholesterol goal established.  Labs pending.  Follow-up based on results.  He does have a form that needs to be filled out above my desk in the clinic hallway.    Heartburn is under good control at this point time no new concerns.  Follow-up as needed.    Still struggles with occasional hemorrhoids.  Advised medication refill.  Hopefully this is less expensive than his original.  Follow-up..    During physical exam we noticed irregular heartbeats with what I suspect is preventricular contractions.  EKG consistent with sinus rhythm with exception of occasional ectopic  naila.      COUNSELING:   Reviewed preventive health counseling, as reflected in patient instructions       Regular exercise       Healthy diet/nutrition       Vision screening       Hearing screening       Alcohol Use        Safe sex practices/STD prevention       Colorectal cancer screening       Prostate cancer screening        He reports that he has quit smoking. His smoking use included dip, chew, snus or snuff. He has quit using smokeless tobacco.            JOEL Mortensen [facility]

## 2023-09-21 ENCOUNTER — TELEPHONE (OUTPATIENT)
Dept: FAMILY MEDICINE | Facility: OTHER | Age: 53
End: 2023-09-21
Payer: COMMERCIAL

## 2023-09-21 NOTE — TELEPHONE ENCOUNTER
Provider Screening form Camron-Juan has been completed and faxed to [phone].  Original mailed to patient and copy sent to scanning.    Yue Juarez CMA

## 2024-08-19 ENCOUNTER — PATIENT OUTREACH (OUTPATIENT)
Dept: CARE COORDINATION | Facility: CLINIC | Age: 54
End: 2024-08-19
Payer: COMMERCIAL

## 2024-09-26 ENCOUNTER — OFFICE VISIT (OUTPATIENT)
Dept: FAMILY MEDICINE | Facility: OTHER | Age: 54
End: 2024-09-26
Payer: COMMERCIAL

## 2024-09-26 VITALS
DIASTOLIC BLOOD PRESSURE: 108 MMHG | WEIGHT: 204 LBS | HEART RATE: 74 BPM | HEIGHT: 70 IN | RESPIRATION RATE: 16 BRPM | TEMPERATURE: 97.9 F | OXYGEN SATURATION: 98 % | SYSTOLIC BLOOD PRESSURE: 162 MMHG | BODY MASS INDEX: 29.2 KG/M2

## 2024-09-26 DIAGNOSIS — K21.00 GASTROESOPHAGEAL REFLUX DISEASE WITH ESOPHAGITIS, UNSPECIFIED WHETHER HEMORRHAGE: ICD-10-CM

## 2024-09-26 DIAGNOSIS — Z12.11 SCREEN FOR COLON CANCER: ICD-10-CM

## 2024-09-26 DIAGNOSIS — E78.5 HYPERLIPIDEMIA LDL GOAL <130: ICD-10-CM

## 2024-09-26 DIAGNOSIS — I10 HYPERTENSION, GOAL BELOW 140/90: ICD-10-CM

## 2024-09-26 DIAGNOSIS — Z12.5 SCREENING FOR PROSTATE CANCER: ICD-10-CM

## 2024-09-26 DIAGNOSIS — Z00.00 ROUTINE HISTORY AND PHYSICAL EXAMINATION OF ADULT: Primary | ICD-10-CM

## 2024-09-26 LAB
ALBUMIN SERPL BCG-MCNC: 5 G/DL (ref 3.5–5.2)
ALP SERPL-CCNC: 53 U/L (ref 40–150)
ALT SERPL W P-5'-P-CCNC: 28 U/L (ref 0–70)
ANION GAP SERPL CALCULATED.3IONS-SCNC: 16 MMOL/L (ref 7–15)
AST SERPL W P-5'-P-CCNC: 21 U/L (ref 0–45)
BILIRUB SERPL-MCNC: 0.8 MG/DL
BUN SERPL-MCNC: 13.6 MG/DL (ref 6–20)
CALCIUM SERPL-MCNC: 10 MG/DL (ref 8.8–10.4)
CHLORIDE SERPL-SCNC: 98 MMOL/L (ref 98–107)
CHOLEST SERPL-MCNC: 263 MG/DL
CREAT SERPL-MCNC: 0.96 MG/DL (ref 0.67–1.17)
CREAT UR-MCNC: 38.2 MG/DL
EGFRCR SERPLBLD CKD-EPI 2021: >90 ML/MIN/1.73M2
ERYTHROCYTE [DISTWIDTH] IN BLOOD BY AUTOMATED COUNT: 11.9 % (ref 10–15)
FASTING STATUS PATIENT QL REPORTED: YES
FASTING STATUS PATIENT QL REPORTED: YES
GLUCOSE SERPL-MCNC: 83 MG/DL (ref 70–99)
HCO3 SERPL-SCNC: 22 MMOL/L (ref 22–29)
HCT VFR BLD AUTO: 45.3 % (ref 40–53)
HDLC SERPL-MCNC: 37 MG/DL
HGB BLD-MCNC: 16 G/DL (ref 13.3–17.7)
LDLC SERPL CALC-MCNC: 188 MG/DL
MCH RBC QN AUTO: 31.8 PG (ref 26.5–33)
MCHC RBC AUTO-ENTMCNC: 35.3 G/DL (ref 31.5–36.5)
MCV RBC AUTO: 90 FL (ref 78–100)
MICROALBUMIN UR-MCNC: <12 MG/L
MICROALBUMIN/CREAT UR: NORMAL MG/G{CREAT}
NONHDLC SERPL-MCNC: 226 MG/DL
PLATELET # BLD AUTO: 231 10E3/UL (ref 150–450)
POTASSIUM SERPL-SCNC: 3.8 MMOL/L (ref 3.4–5.3)
PROT SERPL-MCNC: 8.1 G/DL (ref 6.4–8.3)
PSA SERPL DL<=0.01 NG/ML-MCNC: 0.44 NG/ML (ref 0–3.5)
RBC # BLD AUTO: 5.03 10E6/UL (ref 4.4–5.9)
SODIUM SERPL-SCNC: 136 MMOL/L (ref 135–145)
TRIGL SERPL-MCNC: 190 MG/DL
WBC # BLD AUTO: 9 10E3/UL (ref 4–11)

## 2024-09-26 PROCEDURE — 99396 PREV VISIT EST AGE 40-64: CPT | Performed by: PHYSICIAN ASSISTANT

## 2024-09-26 PROCEDURE — 85027 COMPLETE CBC AUTOMATED: CPT | Performed by: PHYSICIAN ASSISTANT

## 2024-09-26 PROCEDURE — 36415 COLL VENOUS BLD VENIPUNCTURE: CPT | Performed by: PHYSICIAN ASSISTANT

## 2024-09-26 PROCEDURE — 82043 UR ALBUMIN QUANTITATIVE: CPT | Performed by: PHYSICIAN ASSISTANT

## 2024-09-26 PROCEDURE — 99214 OFFICE O/P EST MOD 30 MIN: CPT | Mod: 25 | Performed by: PHYSICIAN ASSISTANT

## 2024-09-26 PROCEDURE — 80061 LIPID PANEL: CPT | Performed by: PHYSICIAN ASSISTANT

## 2024-09-26 PROCEDURE — 82570 ASSAY OF URINE CREATININE: CPT | Performed by: PHYSICIAN ASSISTANT

## 2024-09-26 PROCEDURE — G0103 PSA SCREENING: HCPCS | Performed by: PHYSICIAN ASSISTANT

## 2024-09-26 PROCEDURE — 80053 COMPREHEN METABOLIC PANEL: CPT | Performed by: PHYSICIAN ASSISTANT

## 2024-09-26 RX ORDER — ESOMEPRAZOLE MAGNESIUM 40 MG/1
40 CAPSULE, DELAYED RELEASE ORAL
Qty: 90 CAPSULE | Refills: 1 | Status: SHIPPED | OUTPATIENT
Start: 2024-09-26

## 2024-09-26 RX ORDER — METOPROLOL SUCCINATE 25 MG/1
25 TABLET, EXTENDED RELEASE ORAL DAILY
Qty: 90 TABLET | Refills: 1 | Status: SHIPPED | OUTPATIENT
Start: 2024-09-26

## 2024-09-26 ASSESSMENT — PAIN SCALES - GENERAL: PAINLEVEL: NO PAIN (1)

## 2024-09-26 NOTE — PROGRESS NOTES
"Preventive Care Visit  [facility]  Delroy Will PA-C, Family Medicine  Sep 26, 2024      Assessment & Plan     Routine history and physical examination of adult  55 y/o male here for physical.  - CBC with platelets; Future  - Comprehensive metabolic panel (BMP + Alb, Alk Phos, ALT, AST, Total. Bili, TP); Future  - Lipid panel reflex to direct LDL Fasting; Future  - CBC with platelets  - Comprehensive metabolic panel (BMP + Alb, Alk Phos, ALT, AST, Total. Bili, TP)  - Lipid panel reflex to direct LDL Fasting    Hypertension, goal below 140/90  Poor control add meds and recheck in 3-4 weeks  - Albumin Random Urine Quantitative with Creat Ratio; Future  - metoprolol succinate ER (TOPROL XL) 25 MG 24 hr tablet; Take 1 tablet (25 mg) by mouth daily.  - Albumin Random Urine Quantitative with Creat Ratio    Hyperlipidemia LDL goal <130  Labs pending    Gastroesophageal reflux disease with esophagitis, unspecified whether hemorrhage  Trial of different medications.  - esomeprazole (NEXIUM) 40 MG DR capsule; Take 1 capsule (40 mg) by mouth every morning (before breakfast). Take 30-60 minutes before eating.    Screen for colon cancer  - Fecal colorectal cancer screen FIT - Future (S+30); Future  - Fecal colorectal cancer screen FIT - Future (S+30)    Screening for prostate cancer  - PSA, screen; Future  - PSA, screen    Patient has been advised of split billing requirements and indicates understanding: Yes        BMI  Estimated body mass index is 29.2 kg/m  as calculated from the following:    Height as of this encounter: 1.78 m (5' 10.08\").    Weight as of this encounter: 92.5 kg (204 lb).   Weight management plan: Discussed healthy diet and exercise guidelines    Counseling  Appropriate preventive services were addressed with this patient via screening, questionnaire, or discussion as appropriate for fall prevention, nutrition, physical activity, Tobacco-use cessation, social engagement, weight " loss and cognition.  Checklist reviewing preventive services available has been given to the patient.  Reviewed patient's diet, addressing concerns and/or questions.       Work on weight loss  Regular exercise    Subjective   Yuriy is a 54 year old, presenting for the following:  Physical        9/26/2024     2:45 PM   Additional Questions   Roomed by Samantha   Accompanied by self         9/26/2024   Forms   Any forms needing to be completed Yes           Health Care Directive  Patient does not have a Health Care Directive or Living Will: Patient states has Advance Directive and will bring in a copy to clinic.    HPI        9/23/2024   General Health   How would you rate your overall physical health? Good   Feel stress (tense, anxious, or unable to sleep) Not at all            9/23/2024   Nutrition   Three or more servings of calcium each day? Yes   Diet: Regular (no restrictions)   How many servings of fruit and vegetables per day? (!) 2-3   How many sweetened beverages each day? 0-1            9/23/2024   Exercise   Days per week of moderate/strenous exercise 4 days   Average minutes spent exercising at this level 50 min            9/23/2024   Social Factors   Frequency of gathering with friends or relatives Once a week   Worry food won't last until get money to buy more No   Food not last or not have enough money for food? No   Do you have housing? (Housing is defined as stable permanent housing and does not include staying ouside in a car, in a tent, in an abandoned building, in an overnight shelter, or couch-surfing.) Yes   Are you worried about losing your housing? No   Lack of transportation? No   Unable to get utilities (heat,electricity)? No            9/23/2024   Fall Risk   Fallen 2 or more times in the past year? No   Trouble with walking or balance? No             9/23/2024   Dental   Dentist two times every year? Yes            9/23/2024   TB Screening   Were you born outside of the [place]? No             Today's PHQ-2 Score:       2024     2:38 PM   PHQ-2 (  [employer])   Q1: Little interest or pleasure in doing things 0   Q2: Feeling down, depressed or hopeless 0   PHQ-2 Score 0   Q1: Little interest or pleasure in doing things Not at all   Q2: Feeling down, depressed or hopeless Not at all   PHQ-2 Score 0           2024   Substance Use   Alcohol more than 3/day or more than 7/wk No   Do you use any other substances recreationally? No        Social History     Tobacco Use    Smoking status: Former     Current packs/day: 0.00     Types: Dip, chew, snus or snuff, Cigarettes     Quit date: 2007     Years since quittin.2    Smokeless tobacco: Former    Tobacco comments:     infrequent use  in past   Vaping Use    Vaping status: Never Used   Substance Use Topics    Alcohol use: Yes     Alcohol/week: 6.7 standard drinks of alcohol     Comment: moderate    Drug use: No     Comment: no street   caffiene daily           2024   STI Screening   New sexual partner(s) since last STI/HIV test? No      ASCVD Risk   The 10-year ASCVD risk score (Jennie WALLACE, et al., 2019) is: 13.6%    Values used to calculate the score:      Age: 54 years      Sex: Male      Is Non- : No      Diabetic: No      Tobacco smoker: No      Systolic Blood Pressure: 162 mmHg      Is BP treated: Yes      HDL Cholesterol: 44 mg/dL      Total Cholesterol: 260 mg/dL       Reviewed and updated as needed this visit by Provider                    Past Medical History:   Diagnosis Date    Hypertension, goal below 140/90 2012    NO ACTIVE PROBLEMS      Past Surgical History:   Procedure Laterality Date    REMOVAL OF SPERM DUCT(S)  1995    Vasectomy    SURGICAL HISTORY OF -       cyst removed above lt eye     Lab work is in process  Labs reviewed in EPIC  BP Readings from Last 3 Encounters:   24 (!) 162/108   23 130/84   23 134/88    Wt Readings from Last 3 Encounters:    24 92.5 kg (204 lb)   23 94.8 kg (209 lb)   23 94.6 kg (208 lb 8 oz)                  Patient Active Problem List   Diagnosis    Impotence of organic origin    Other symptoms involving urinary system    Knee pain    Nasal congestion    Hypertension, goal below 140/90    Hyperlipidemia LDL goal <130    Other male erectile dysfunction    Gastroesophageal reflux disease with esophagitis, unspecified whether hemorrhage    External hemorrhoids    Irregular heart beats     Past Surgical History:   Procedure Laterality Date    REMOVAL OF SPERM DUCT(S)  1995    Vasectomy    SURGICAL HISTORY OF -       cyst removed above lt eye       Social History     Tobacco Use    Smoking status: Former     Current packs/day: 0.00     Types: Dip, chew, snus or snuff, Cigarettes     Quit date: 2007     Years since quittin.2    Smokeless tobacco: Former    Tobacco comments:     infrequent use  in past   Substance Use Topics    Alcohol use: Yes     Alcohol/week: 6.7 standard drinks of alcohol     Comment: moderate     Family History   Problem Relation Age of Onset    Hypertension Father     Cancer Father     Other Cancer Father     Diabetes Paternal Grandfather     Cerebrovascular Disease Maternal Grandmother     Cerebrovascular Disease Maternal Grandfather          Current Outpatient Medications   Medication Sig Dispense Refill    esomeprazole (NEXIUM) 40 MG DR capsule Take 1 capsule (40 mg) by mouth every morning (before breakfast). Take 30-60 minutes before eating. 90 capsule 1    lisinopril-hydrochlorothiazide (ZESTORETIC) 20-12.5 MG tablet Take 2 tablets by mouth daily 180 tablet 3    metoprolol succinate ER (TOPROL XL) 25 MG 24 hr tablet Take 1 tablet (25 mg) by mouth daily. 90 tablet 1    omeprazole (PRILOSEC) 40 MG DR capsule Take 1 capsule (40 mg) by mouth daily 90 capsule 3     Allergies   Allergen Reactions    No Known Drug Allergy      Recent Labs   Lab Test 23  1512 22  1508  "03/24/21  1425 10/03/19  1507   * 170* 177* 167*   HDL 44 36* 43 43   TRIG 304* 132 247* 205*   ALT 32 53 38 47   CR 0.94 0.89 0.90 0.95   GFRESTIMATED >90 >90 >90 >90   GFRESTBLACK  --   --  >90 >90   POTASSIUM 4.0 3.7 3.6 4.0          Review of Systems  Constitutional, HEENT, cardiovascular, pulmonary, GI, , musculoskeletal, neuro, skin, endocrine and psych systems are negative, except as otherwise noted.     Objective    Exam  BP (!) 162/108   Pulse 74   Temp 97.9  F (36.6  C) (Temporal)   Resp 16   Ht 1.78 m (5' 10.08\")   Wt 92.5 kg (204 lb)   SpO2 98%   BMI 29.20 kg/m     Estimated body mass index is 29.2 kg/m  as calculated from the following:    Height as of this encounter: 1.78 m (5' 10.08\").    Weight as of this encounter: 92.5 kg (204 lb).    Physical Exam  GENERAL: alert and no distress  EYES: Eyes grossly normal to inspection, PERRL and conjunctivae and sclerae normal  HENT: ear canals and TM's normal, nose and mouth without ulcers or lesions  NECK: no adenopathy, no asymmetry, masses, or scars  RESP: lungs clear to auscultation - no rales, rhonchi or wheezes  CV: regular rate and rhythm, normal S1 S2, no S3 or S4, no murmur, click or rub, no peripheral edema  ABDOMEN: soft, nontender, no hepatosplenomegaly, no masses and bowel sounds normal  MS: no gross musculoskeletal defects noted, no edema  SKIN: no suspicious lesions or rashes  NEURO: Normal strength and tone, mentation intact and speech normal  PSYCH: mentation appears normal, affect normal/bright        Signed Electronically by: Delroy Will PA-C    "

## 2024-10-21 LAB — HEMOCCULT STL QL IA: NEGATIVE

## 2024-12-05 DIAGNOSIS — K21.00 GASTROESOPHAGEAL REFLUX DISEASE WITH ESOPHAGITIS, UNSPECIFIED WHETHER HEMORRHAGE: ICD-10-CM

## 2024-12-05 RX ORDER — OMEPRAZOLE 40 MG/1
40 CAPSULE, DELAYED RELEASE ORAL DAILY
Qty: 90 CAPSULE | Refills: 2 | Status: SHIPPED | OUTPATIENT
Start: 2024-12-05

## 2024-12-13 DIAGNOSIS — I10 HYPERTENSION, GOAL BELOW 140/90: ICD-10-CM

## 2024-12-16 NOTE — TELEPHONE ENCOUNTER
Patient Contact    Attempt # 1    Was call answered?  No.  Left message on voicemail with information to call me back.    Upon call back, please schedule patient for BP check in clinic then route refill request to PCP.     Martha BARNESN, RN

## 2024-12-17 NOTE — TELEPHONE ENCOUNTER
Patient Contact    Attempt # 2    Was call answered?  No.  Left message on voicemail with information to call me back.    Sent my chart message. Will postpone encounter to ensure message is read.     Martha BARNESN, RN

## 2024-12-18 RX ORDER — LISINOPRIL AND HYDROCHLOROTHIAZIDE 12.5; 2 MG/1; MG/1
2 TABLET ORAL DAILY
Qty: 180 TABLET | Refills: 3 | OUTPATIENT
Start: 2024-12-18

## 2024-12-18 NOTE — TELEPHONE ENCOUNTER
Spoke with patient, reports he doesn't need refilled at this time, but does report that he's taking it as scheduled. Declined to make appointment for BP recheck at this time. Will reach back out to team when he needs refill and/or for BP recheck appointment.     Marleni Horn, RN, BSN

## 2025-03-25 DIAGNOSIS — I10 HYPERTENSION, GOAL BELOW 140/90: ICD-10-CM

## 2025-03-25 RX ORDER — METOPROLOL SUCCINATE 25 MG/1
25 TABLET, EXTENDED RELEASE ORAL DAILY
Qty: 90 TABLET | Refills: 1 | OUTPATIENT
Start: 2025-03-25

## 2025-03-25 NOTE — TELEPHONE ENCOUNTER
Needs a visit for blood pressure recheck.No further refills without office visit.   Electronically signed: Delroy Lieberman PA-C

## 2025-04-01 DIAGNOSIS — K21.00 GASTROESOPHAGEAL REFLUX DISEASE WITH ESOPHAGITIS, UNSPECIFIED WHETHER HEMORRHAGE: ICD-10-CM

## 2025-04-01 RX ORDER — OMEPRAZOLE 40 MG/1
40 CAPSULE, DELAYED RELEASE ORAL DAILY
Qty: 90 CAPSULE | Refills: 2 | OUTPATIENT
Start: 2025-04-01

## 2025-04-24 ENCOUNTER — ALLIED HEALTH/NURSE VISIT (OUTPATIENT)
Dept: FAMILY MEDICINE | Facility: OTHER | Age: 55
End: 2025-04-24
Payer: COMMERCIAL

## 2025-04-24 ENCOUNTER — TELEPHONE (OUTPATIENT)
Dept: FAMILY MEDICINE | Facility: OTHER | Age: 55
End: 2025-04-24

## 2025-04-24 VITALS — DIASTOLIC BLOOD PRESSURE: 90 MMHG | SYSTOLIC BLOOD PRESSURE: 126 MMHG

## 2025-04-24 DIAGNOSIS — I10 HYPERTENSION, GOAL BELOW 140/90: Primary | ICD-10-CM

## 2025-04-24 NOTE — PROGRESS NOTES
I met with Meño Ernst at the request of Delroy Will to recheck his blood pressure.  Blood pressure medications on the med list were reviewed with patient.    Patient has taken all medications as per usual regimen: Yes  Patient reports tolerating them without any issues or concerns: Yes    Vitals:    04/24/25 1425 04/24/25 1428   BP: 126/90 126/90       Blood pressure was taken, previous encounter was reviewed,  The top number is lower then 140 bottom number is 90    Rechecking one more time.    Patient is not have any symptoms.

## 2025-04-24 NOTE — TELEPHONE ENCOUNTER
I met with Meño Ernst at the request of Delroy Will to recheck his blood pressure.  Blood pressure medications on the med list were reviewed with patient.    Patient has taken all medications as per usual regimen: Yes  Patient reports tolerating them without any issues or concerns: Yes     Vitals        Vitals:     04/24/25 1425 04/24/25 1428   BP: 126/90 126/90            Blood pressure was taken, previous encounter was reviewed,  The top number is lower then 140 bottom number is 90     Rechecking one more time.     Patient is not have any symptoms.

## 2025-04-29 ENCOUNTER — TELEPHONE (OUTPATIENT)
Dept: FAMILY MEDICINE | Facility: OTHER | Age: 55
End: 2025-04-29
Payer: COMMERCIAL

## 2025-04-29 DIAGNOSIS — I10 HYPERTENSION, GOAL BELOW 140/90: ICD-10-CM

## 2025-04-29 RX ORDER — METOPROLOL SUCCINATE 25 MG/1
25 TABLET, EXTENDED RELEASE ORAL DAILY
Qty: 90 TABLET | Refills: 1 | Status: SHIPPED | OUTPATIENT
Start: 2025-04-29

## 2025-04-29 RX ORDER — LISINOPRIL AND HYDROCHLOROTHIAZIDE 12.5; 2 MG/1; MG/1
2 TABLET ORAL DAILY
Qty: 180 TABLET | Refills: 1 | Status: SHIPPED | OUTPATIENT
Start: 2025-04-29

## 2025-04-29 NOTE — TELEPHONE ENCOUNTER
Patient Quality Outreach    Patient is due for the following:   Hypertension -  Hypertension follow-up visit    Action(s) Taken:   Schedule a office visit for Hypertension follow up within 3 months from now per [facility]    Type of outreach:    Sent [facility] message.    Questions for provider review:    None         Edwina Freeman CMA  Chart routed to None.

## 2025-07-03 DIAGNOSIS — K21.00 GASTROESOPHAGEAL REFLUX DISEASE WITH ESOPHAGITIS, UNSPECIFIED WHETHER HEMORRHAGE: ICD-10-CM

## 2025-07-03 RX ORDER — ESOMEPRAZOLE MAGNESIUM 40 MG/1
40 CAPSULE, DELAYED RELEASE ORAL
Qty: 90 CAPSULE | Refills: 0 | Status: SHIPPED | OUTPATIENT
Start: 2025-07-03

## 2025-08-08 ENCOUNTER — E-VISIT (OUTPATIENT)
Dept: FAMILY MEDICINE | Facility: OTHER | Age: 55
End: 2025-08-08
Payer: COMMERCIAL

## 2025-08-08 DIAGNOSIS — J06.9 ACUTE UPPER RESPIRATORY INFECTION, UNSPECIFIED: Primary | ICD-10-CM

## 2025-08-08 PROCEDURE — 99207 PR NON-BILLABLE SERV PER CHARTING: CPT | Performed by: PHYSICIAN ASSISTANT

## 2025-08-10 RX ORDER — CETIRIZINE HYDROCHLORIDE 10 MG/1
10 TABLET ORAL DAILY
Qty: 30 TABLET | Refills: 3 | Status: SHIPPED | OUTPATIENT
Start: 2025-08-10

## 2025-08-10 RX ORDER — GUAIFENESIN 1200 MG/1
1200 TABLET, EXTENDED RELEASE ORAL 2 TIMES DAILY
Qty: 60 TABLET | Refills: 0 | Status: SHIPPED | OUTPATIENT
Start: 2025-08-10

## 2025-08-10 RX ORDER — ACETAMINOPHEN 500 MG
500-1000 TABLET ORAL EVERY 4 HOURS PRN
Qty: 30 TABLET | Refills: 0 | Status: SHIPPED | OUTPATIENT
Start: 2025-08-10

## 2025-08-27 ENCOUNTER — PATIENT OUTREACH (OUTPATIENT)
Dept: CARE COORDINATION | Facility: CLINIC | Age: 55
End: 2025-08-27
Payer: COMMERCIAL

## 2025-09-04 DIAGNOSIS — K21.00 GASTROESOPHAGEAL REFLUX DISEASE WITH ESOPHAGITIS, UNSPECIFIED WHETHER HEMORRHAGE: ICD-10-CM

## 2025-09-04 RX ORDER — OMEPRAZOLE 40 MG/1
40 CAPSULE, DELAYED RELEASE ORAL DAILY
Qty: 90 CAPSULE | Refills: 2 | OUTPATIENT
Start: 2025-09-04